# Patient Record
Sex: MALE | Race: WHITE | NOT HISPANIC OR LATINO | Employment: OTHER | URBAN - METROPOLITAN AREA
[De-identification: names, ages, dates, MRNs, and addresses within clinical notes are randomized per-mention and may not be internally consistent; named-entity substitution may affect disease eponyms.]

---

## 2018-10-11 PROBLEM — J43.2 CENTRILOBULAR EMPHYSEMA: Status: ACTIVE | Noted: 2018-10-11

## 2018-10-13 PROBLEM — I70.0 ATHEROSCLEROSIS OF AORTA: Status: ACTIVE | Noted: 2018-10-13

## 2019-06-12 ENCOUNTER — OFFICE VISIT (OUTPATIENT)
Dept: ORTHOPEDICS | Facility: CLINIC | Age: 69
End: 2019-06-12
Payer: MEDICARE

## 2019-06-12 VITALS
HEART RATE: 79 BPM | WEIGHT: 205.25 LBS | SYSTOLIC BLOOD PRESSURE: 139 MMHG | HEIGHT: 72 IN | DIASTOLIC BLOOD PRESSURE: 90 MMHG | BODY MASS INDEX: 27.8 KG/M2

## 2019-06-12 DIAGNOSIS — M67.40 MUCOID CYST OF JOINT: Primary | ICD-10-CM

## 2019-06-12 PROCEDURE — 1101F PR PT FALLS ASSESS DOC 0-1 FALLS W/OUT INJ PAST YR: ICD-10-PCS | Mod: S$GLB,,, | Performed by: ORTHOPAEDIC SURGERY

## 2019-06-12 PROCEDURE — 3080F PR MOST RECENT DIASTOLIC BLOOD PRESSURE >= 90 MM HG: ICD-10-PCS | Mod: S$GLB,,, | Performed by: ORTHOPAEDIC SURGERY

## 2019-06-12 PROCEDURE — 1101F PT FALLS ASSESS-DOCD LE1/YR: CPT | Mod: S$GLB,,, | Performed by: ORTHOPAEDIC SURGERY

## 2019-06-12 PROCEDURE — 99999 PR PBB SHADOW E&M-NEW PATIENT-LVL III: ICD-10-PCS | Mod: PBBFAC,,, | Performed by: ORTHOPAEDIC SURGERY

## 2019-06-12 PROCEDURE — 99203 OFFICE O/P NEW LOW 30 MIN: CPT | Mod: S$GLB,,, | Performed by: ORTHOPAEDIC SURGERY

## 2019-06-12 PROCEDURE — 99203 PR OFFICE/OUTPT VISIT, NEW, LEVL III, 30-44 MIN: ICD-10-PCS | Mod: S$GLB,,, | Performed by: ORTHOPAEDIC SURGERY

## 2019-06-12 PROCEDURE — 3075F PR MOST RECENT SYSTOLIC BLOOD PRESS GE 130-139MM HG: ICD-10-PCS | Mod: S$GLB,,, | Performed by: ORTHOPAEDIC SURGERY

## 2019-06-12 PROCEDURE — 3075F SYST BP GE 130 - 139MM HG: CPT | Mod: S$GLB,,, | Performed by: ORTHOPAEDIC SURGERY

## 2019-06-12 PROCEDURE — 99999 PR PBB SHADOW E&M-NEW PATIENT-LVL III: CPT | Mod: PBBFAC,,, | Performed by: ORTHOPAEDIC SURGERY

## 2019-06-12 PROCEDURE — 3080F DIAST BP >= 90 MM HG: CPT | Mod: S$GLB,,, | Performed by: ORTHOPAEDIC SURGERY

## 2019-06-12 NOTE — H&P
"6/12/2019    Chief Complaint:  Chief Complaint   Patient presents with    Hand Problem       HPI:  Greg Santana is a 68 y.o. male, who presents to clinic today has a history of a mass overlying his right index finger.  States that it has been there for several months and has gradually worsening.  States that it is not causing him any pain or dysfunction.  He is here today for further evaluation.    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 No pertinent family history.    SOCHX:  Social History     Tobacco Use    Smoking status: Former Smoker     Packs/day: 1.00     Years: 20.00     Pack years: 20.00    Smokeless tobacco: Never Used   Substance Use Topics    Alcohol use: Yes     Alcohol/week: 0.6 oz     Types: 1 Glasses of wine per week       ALLERGIES:  Patient has no known allergies.    CURRENT MEDICATIONS:  Current Outpatient Medications on File Prior to Visit   Medication Sig Dispense Refill    allopurinol (ZYLOPRIM) 300 MG tablet TAKE 2 TABLETS DAILY 180 tablet 3    aspirin (ECOTRIN) 81 MG EC tablet Take 81 mg by mouth once daily.      dabigatran etexilate (PRADAXA) 150 mg Cap Take 150 mg by mouth 2 (two) times daily. "Do NOT break, chew, or open capsules."      nebivolol (BYSTOLIC) 5 MG Tab Take 10 mg by mouth once daily.       rosuvastatin (CRESTOR) 20 MG tablet Take 1 tablet (20 mg total) by mouth once daily. 90 tablet 3     No current facility-administered medications on file prior to visit.        REVIEW OF SYSTEMS:  Review of Systems   Constitutional: Negative.    HENT: Negative for hearing loss, nosebleeds " and sore throat.    Respiratory: Negative for shortness of breath and wheezing.    Cardiovascular: Negative for chest pain and palpitations.   Gastrointestinal: Negative for heartburn, nausea and vomiting.   Genitourinary: Negative for dysuria and urgency.   Skin: Negative.    Neurological: Negative for seizures, loss of consciousness and weakness.       GENERAL PHYSICAL EXAM:   BP (!) 139/90   Pulse 79   Ht 6' (1.829 m)   Wt 93.1 kg (205 lb 4 oz)   BMI 27.84 kg/m²    GEN: well developed, well nourished, no acute distress   HENT: Normocephalic, atraumatic   EYES: No discharge, conjunctiva normal   NECK: Supple, non-tender   PULM: No wheezing, no respiratory distress   CV: RRR   ABD: Soft, non-tender    ORTHO EXAM:   Examination of the right hand and index finger reveals that there is a what appears to be ganglion/mucoid cyst overlying the distal interphalangeal joint dorsally.  There is no surrounding erythema or edema.  Palpation in the area produces minimal tenderness.  He is able flex and extend the joint.  He has 2+ radial pulse.    RADIOLOGY:   None    ASSESSMENT:   Right index finger mucoid cyst    PLAN:  1.  Patient has elected to continue to observe this as he is not having any significant difficulty at this time    2.  Will follow up with me on a p.r.n. basis

## 2019-06-12 NOTE — LETTER
June 12, 2019      MATTHEW Morris Jr., MD  [address]  [address]  [facility] [address]           [facility]  [address] [facility] [address] [place] [address]  Phone: [phone]          Patient: Greg Santana   MR Number: [id number]   YOB: 1950   Date of Visit: 6/12/2019       Dear Dr. MATTHEW Morris Jr.:    Thank you for referring Greg Santana to me for evaluation. Attached you will find relevant portions of my assessment and plan of care.    If you have questions, please do not hesitate to call me. I look forward to following Greg Santana along with you.    Sincerely,    Adrian Hayes MD    Enclosure  CC:  No Recipients    If you would like to receive this communication electronically, please contact [email or web address] or [phone] to request more information on [email or web address] access.    For providers and/or their staff who would like to refer a patient to [facility], please contact us through our one-stop-shop provider referral line, [facility], at [phone].    If you feel you have received this communication in error or would no longer like to receive these types of communications, please e-mail [email or web address]

## 2019-07-16 PROBLEM — I34.0 MITRAL VALVE REGURGITATION: Status: ACTIVE | Noted: 2019-07-16

## 2019-10-10 PROBLEM — Z79.899 ON FOUR OR MORE MEDICATIONS: Status: ACTIVE | Noted: 2019-10-10

## 2019-10-10 PROBLEM — Z79.01: Status: ACTIVE | Noted: 2019-10-10

## 2019-10-10 PROBLEM — Z87.891 PERSONAL HISTORY OF TOBACCO USE, PRESENTING HAZARDS TO HEALTH: Status: ACTIVE | Noted: 2019-10-10

## 2019-10-10 PROBLEM — Z79.82 ASPIRIN LONG-TERM USE: Status: ACTIVE | Noted: 2019-10-10

## 2020-06-11 PROBLEM — N18.2 CKD (CHRONIC KIDNEY DISEASE) STAGE 2, GFR 60-89 ML/MIN: Chronic | Status: ACTIVE | Noted: 2020-06-11

## 2020-06-12 PROBLEM — Z79.899 ON STATIN THERAPY: Status: ACTIVE | Noted: 2019-10-10

## 2020-07-24 ENCOUNTER — OFFICE VISIT (OUTPATIENT)
Dept: URGENT CARE | Facility: CLINIC | Age: 70
End: 2020-07-24
Payer: MEDICARE

## 2020-07-24 VITALS
BODY MASS INDEX: 26.51 KG/M2 | HEIGHT: 73 IN | HEART RATE: 92 BPM | TEMPERATURE: 98 F | WEIGHT: 200 LBS | SYSTOLIC BLOOD PRESSURE: 127 MMHG | RESPIRATION RATE: 16 BRPM | DIASTOLIC BLOOD PRESSURE: 79 MMHG

## 2020-07-24 DIAGNOSIS — R09.81 SINUS CONGESTION: ICD-10-CM

## 2020-07-24 PROCEDURE — 99214 OFFICE O/P EST MOD 30 MIN: CPT | Mod: S$GLB,,, | Performed by: PHYSICIAN ASSISTANT

## 2020-07-24 PROCEDURE — U0003 INFECTIOUS AGENT DETECTION BY NUCLEIC ACID (DNA OR RNA); SEVERE ACUTE RESPIRATORY SYNDROME CORONAVIRUS 2 (SARS-COV-2) (CORONAVIRUS DISEASE [COVID-19]), AMPLIFIED PROBE TECHNIQUE, MAKING USE OF HIGH THROUGHPUT TECHNOLOGIES AS DESCRIBED BY CMS-2020-01-R: HCPCS

## 2020-07-24 PROCEDURE — 99214 PR OFFICE/OUTPT VISIT, EST, LEVL IV, 30-39 MIN: ICD-10-PCS | Mod: S$GLB,,, | Performed by: PHYSICIAN ASSISTANT

## 2020-07-24 RX ORDER — AZELASTINE 1 MG/ML
1 SPRAY, METERED NASAL 2 TIMES DAILY
Qty: 30 ML | Refills: 0 | Status: SHIPPED | OUTPATIENT
Start: 2020-07-24 | End: 2020-12-16

## 2020-07-24 RX ORDER — FLUTICASONE PROPIONATE 50 MCG
1 SPRAY, SUSPENSION (ML) NASAL DAILY
Qty: 16 G | Refills: 0 | Status: SHIPPED | OUTPATIENT
Start: 2020-07-24 | End: 2020-12-16

## 2020-07-24 RX ORDER — LATANOPROST 50 UG/ML
1 SOLUTION/ DROPS OPHTHALMIC NIGHTLY
COMMUNITY
Start: 2020-07-09

## 2020-07-24 RX ORDER — LEVOCETIRIZINE DIHYDROCHLORIDE 5 MG/1
5 TABLET, FILM COATED ORAL NIGHTLY
Qty: 30 TABLET | Refills: 0 | Status: SHIPPED | OUTPATIENT
Start: 2020-07-24 | End: 2020-12-16

## 2020-07-24 NOTE — PATIENT INSTRUCTIONS
· Follow up with your primary care if symptoms do not improve, or you may return here at any time.  · If you were referred to a specialist, please follow up with that specialty.  · If you were prescribed antibiotics, please take them to completion.  · If you were prescribed a narcotic or any medication with sedative effects, do not drive or operate heavy equipment or machinery while taking these medications.  · You must understand that you have received treatment at an Urgent Care facility only, and that you may be released before all of your medical problems are known or treated. Urgent Care facilities are not equipped to handle life threatening emergencies. It is recommended that you seek care at an Emergency Department for further evaluation of worsening or concerning symptoms, or possibly life threatening conditions as discussed.                                        If you  smoke, please stop smoking               PLEASE PRACTICE SOCIAL DISTANCING

## 2020-07-24 NOTE — PROGRESS NOTES
"Subjective:       Patient ID: Greg Santana is a 69 y.o. male.    Vitals:  height is 6' 1" (1.854 m) and weight is 90.7 kg (200 lb). His oral temperature is 97.5 °F (36.4 °C). His blood pressure is 127/79 and his pulse is 92. His respiration is 16.     Chief Complaint: Sinus Problem    Patient reports with sinus drainage 3 days, wife is covid positive.    Sinus Problem  This is a new problem. The current episode started in the past 7 days. The problem is unchanged. There has been no fever. Associated symptoms include congestion, headaches and sinus pressure. Pertinent negatives include no chills, coughing, shortness of breath or sore throat. Past treatments include nothing.       Constitution: Negative for chills, fatigue and fever.   HENT: Positive for congestion and sinus pressure. Negative for sore throat.    Neck: Negative for painful lymph nodes.   Cardiovascular: Negative for chest pain and leg swelling.   Eyes: Negative for double vision and blurred vision.   Respiratory: Negative for cough and shortness of breath.    Gastrointestinal: Negative for nausea, vomiting and diarrhea.   Genitourinary: Negative for dysuria, frequency and urgency.   Musculoskeletal: Negative for joint pain, joint swelling, muscle cramps and muscle ache.   Skin: Negative for color change, pale and rash.   Allergic/Immunologic: Negative for seasonal allergies.   Neurological: Positive for headaches. Negative for dizziness, history of vertigo, light-headedness and passing out.   Hematologic/Lymphatic: Negative for swollen lymph nodes, easy bruising/bleeding and history of blood clots. Does not bruise/bleed easily.   Psychiatric/Behavioral: Negative for nervous/anxious, sleep disturbance and depression. The patient is not nervous/anxious.        Objective:      Physical Exam   Constitutional: He is oriented to person, place, and time. He appears well-developed. He is cooperative.  Non-toxic appearance. He does not appear ill. No " distress.   HENT:   Head: Normocephalic and atraumatic.   Ears:   Right Ear: Hearing, tympanic membrane, external ear and ear canal normal.   Left Ear: Hearing, tympanic membrane, external ear and ear canal normal.   Nose: Mucosal edema (mild) present. No rhinorrhea or nasal deformity. No epistaxis. Right sinus exhibits no maxillary sinus tenderness and no frontal sinus tenderness. Left sinus exhibits no maxillary sinus tenderness and no frontal sinus tenderness.   Mouth/Throat: Uvula is midline, oropharynx is clear and moist and mucous membranes are normal. No trismus in the jaw. Normal dentition. No uvula swelling. No oropharyngeal exudate, posterior oropharyngeal edema or posterior oropharyngeal erythema.       Eyes: Pupils are equal, round, and reactive to light. Conjunctivae, EOM and lids are normal. No scleral icterus.   Neck: Trachea normal, normal range of motion, full passive range of motion without pain and phonation normal. Neck supple. No neck rigidity. No edema and no erythema present.   Cardiovascular: Normal rate, regular rhythm, normal heart sounds and normal pulses.   Pulmonary/Chest: Effort normal and breath sounds normal. No respiratory distress. He has no decreased breath sounds. He has no rhonchi.   Abdominal: Soft. Normal appearance and bowel sounds are normal. He exhibits no distension. There is no abdominal tenderness.   Musculoskeletal: Normal range of motion.         General: No deformity.   Neurological: He is alert and oriented to person, place, and time. No cranial nerve deficit. He exhibits normal muscle tone. Coordination normal.   Skin: Skin is warm, dry, intact, not diaphoretic and not pale. Psychiatric: His speech is normal and behavior is normal. Judgment and thought content normal.   Nursing note and vitals reviewed.        Assessment:       1. Sinus congestion        Plan:       All hx was provided by the pt or available as part of established EMR. The pt past medical hx, family  hx, social hx, and current medications were reviewed. Pt to follow up with [facility] if no improvement or for any concern, and seek treatment in an ER for worsening. Tx options discussed. Pt voiced understanding of all discussed, and agreed with decision making.    Sinus congestion  -     COVID-19 Routine Screening  -     azelastine (ASTELIN) 137 mcg (0.1 %) nasal spray; 1 spray (137 mcg total) by Nasal route 2 (two) times daily.  Dispense: 30 mL; Refill: 0  -     fluticasone propionate (FLONASE) 50 mcg/actuation nasal spray; 1 spray (50 mcg total) by Each Nostril route once daily.  Dispense: 16 g; Refill: 0  -     levocetirizine (XYZAL) 5 MG tablet; Take 1 tablet (5 mg total) by mouth every evening.  Dispense: 30 tablet; Refill: 0      Patient Instructions   · Follow up with your primary care if symptoms do not improve, or you may return here at any time.  · If you were referred to a specialist, please follow up with that specialty.  · If you were prescribed antibiotics, please take them to completion.  · If you were prescribed a narcotic or any medication with sedative effects, do not drive or operate heavy equipment or machinery while taking these medications.  · You must understand that you have received treatment at an Urgent Care facility only, and that you may be released before all of your medical problems are known or treated. Urgent Care facilities are not equipped to handle life threatening emergencies. It is recommended that you seek care at an Emergency Department for further evaluation of worsening or concerning symptoms, or possibly life threatening conditions as discussed.                                        If you  smoke, please stop smoking               PLEASE PRACTICE SOCIAL DISTANCING

## 2020-07-26 LAB — SARS-COV-2 RNA RESP QL NAA+PROBE: NOT DETECTED

## 2020-09-14 PROBLEM — I65.21 CAROTID STENOSIS, RIGHT: Status: ACTIVE | Noted: 2020-09-14

## 2021-01-22 ENCOUNTER — CLINICAL SUPPORT (OUTPATIENT)
Dept: URGENT CARE | Facility: CLINIC | Age: 71
End: 2021-01-22
Payer: MEDICARE

## 2021-01-22 ENCOUNTER — PATIENT MESSAGE (OUTPATIENT)
Dept: ADMINISTRATIVE | Facility: OTHER | Age: 71
End: 2021-01-22

## 2021-01-22 VITALS — HEART RATE: 79 BPM | TEMPERATURE: 97 F | OXYGEN SATURATION: 99 %

## 2021-01-22 DIAGNOSIS — J02.9 SORE THROAT: Primary | ICD-10-CM

## 2021-01-22 LAB
CTP QC/QA: YES
SARS-COV-2 RDRP RESP QL NAA+PROBE: NEGATIVE

## 2021-01-22 PROCEDURE — U0002: ICD-10-PCS | Mod: QW,S$GLB,, | Performed by: PHYSICIAN ASSISTANT

## 2021-01-22 PROCEDURE — U0002 COVID-19 LAB TEST NON-CDC: HCPCS | Mod: QW,S$GLB,, | Performed by: PHYSICIAN ASSISTANT

## 2021-02-18 DIAGNOSIS — R91.1 INCIDENTAL LUNG NODULE: Primary | ICD-10-CM

## 2021-02-23 ENCOUNTER — OFFICE VISIT (OUTPATIENT)
Dept: PULMONOLOGY | Facility: CLINIC | Age: 71
End: 2021-02-23
Payer: MEDICARE

## 2021-02-23 VITALS
HEIGHT: 73 IN | HEART RATE: 82 BPM | SYSTOLIC BLOOD PRESSURE: 146 MMHG | WEIGHT: 198.19 LBS | BODY MASS INDEX: 26.27 KG/M2 | DIASTOLIC BLOOD PRESSURE: 78 MMHG

## 2021-02-23 DIAGNOSIS — Z87.891 PERSONAL HISTORY OF TOBACCO USE, PRESENTING HAZARDS TO HEALTH: ICD-10-CM

## 2021-02-23 DIAGNOSIS — R91.8 MULTIPLE LUNG NODULES: ICD-10-CM

## 2021-02-23 DIAGNOSIS — J43.2 CENTRILOBULAR EMPHYSEMA: Primary | ICD-10-CM

## 2021-02-23 DIAGNOSIS — R06.09 DOE (DYSPNEA ON EXERTION): ICD-10-CM

## 2021-02-23 DIAGNOSIS — I48.21 PERMANENT ATRIAL FIBRILLATION: ICD-10-CM

## 2021-02-23 PROCEDURE — 3288F PR FALLS RISK ASSESSMENT DOCUMENTED: ICD-10-PCS | Mod: CPTII,S$GLB,, | Performed by: THORACIC SURGERY (CARDIOTHORACIC VASCULAR SURGERY)

## 2021-02-23 PROCEDURE — 3078F PR MOST RECENT DIASTOLIC BLOOD PRESSURE < 80 MM HG: ICD-10-PCS | Mod: CPTII,S$GLB,, | Performed by: THORACIC SURGERY (CARDIOTHORACIC VASCULAR SURGERY)

## 2021-02-23 PROCEDURE — 3008F BODY MASS INDEX DOCD: CPT | Mod: CPTII,S$GLB,, | Performed by: THORACIC SURGERY (CARDIOTHORACIC VASCULAR SURGERY)

## 2021-02-23 PROCEDURE — 1101F PR PT FALLS ASSESS DOC 0-1 FALLS W/OUT INJ PAST YR: ICD-10-PCS | Mod: CPTII,S$GLB,, | Performed by: THORACIC SURGERY (CARDIOTHORACIC VASCULAR SURGERY)

## 2021-02-23 PROCEDURE — 99999 PR PBB SHADOW E&M-EST. PATIENT-LVL III: CPT | Mod: PBBFAC,,, | Performed by: THORACIC SURGERY (CARDIOTHORACIC VASCULAR SURGERY)

## 2021-02-23 PROCEDURE — 1126F PR PAIN SEVERITY QUANTIFIED, NO PAIN PRESENT: ICD-10-PCS | Mod: S$GLB,,, | Performed by: THORACIC SURGERY (CARDIOTHORACIC VASCULAR SURGERY)

## 2021-02-23 PROCEDURE — 3077F SYST BP >= 140 MM HG: CPT | Mod: CPTII,S$GLB,, | Performed by: THORACIC SURGERY (CARDIOTHORACIC VASCULAR SURGERY)

## 2021-02-23 PROCEDURE — 1159F PR MEDICATION LIST DOCUMENTED IN MEDICAL RECORD: ICD-10-PCS | Mod: S$GLB,,, | Performed by: THORACIC SURGERY (CARDIOTHORACIC VASCULAR SURGERY)

## 2021-02-23 PROCEDURE — 3077F PR MOST RECENT SYSTOLIC BLOOD PRESSURE >= 140 MM HG: ICD-10-PCS | Mod: CPTII,S$GLB,, | Performed by: THORACIC SURGERY (CARDIOTHORACIC VASCULAR SURGERY)

## 2021-02-23 PROCEDURE — 1101F PT FALLS ASSESS-DOCD LE1/YR: CPT | Mod: CPTII,S$GLB,, | Performed by: THORACIC SURGERY (CARDIOTHORACIC VASCULAR SURGERY)

## 2021-02-23 PROCEDURE — 1159F MED LIST DOCD IN RCRD: CPT | Mod: S$GLB,,, | Performed by: THORACIC SURGERY (CARDIOTHORACIC VASCULAR SURGERY)

## 2021-02-23 PROCEDURE — 3008F PR BODY MASS INDEX (BMI) DOCUMENTED: ICD-10-PCS | Mod: CPTII,S$GLB,, | Performed by: THORACIC SURGERY (CARDIOTHORACIC VASCULAR SURGERY)

## 2021-02-23 PROCEDURE — 99999 PR PBB SHADOW E&M-EST. PATIENT-LVL III: ICD-10-PCS | Mod: PBBFAC,,, | Performed by: THORACIC SURGERY (CARDIOTHORACIC VASCULAR SURGERY)

## 2021-02-23 PROCEDURE — 1126F AMNT PAIN NOTED NONE PRSNT: CPT | Mod: S$GLB,,, | Performed by: THORACIC SURGERY (CARDIOTHORACIC VASCULAR SURGERY)

## 2021-02-23 PROCEDURE — 3078F DIAST BP <80 MM HG: CPT | Mod: CPTII,S$GLB,, | Performed by: THORACIC SURGERY (CARDIOTHORACIC VASCULAR SURGERY)

## 2021-02-23 PROCEDURE — 99205 PR OFFICE/OUTPT VISIT, NEW, LEVL V, 60-74 MIN: ICD-10-PCS | Mod: S$GLB,,, | Performed by: THORACIC SURGERY (CARDIOTHORACIC VASCULAR SURGERY)

## 2021-02-23 PROCEDURE — 99205 OFFICE O/P NEW HI 60 MIN: CPT | Mod: S$GLB,,, | Performed by: THORACIC SURGERY (CARDIOTHORACIC VASCULAR SURGERY)

## 2021-02-23 PROCEDURE — 3288F FALL RISK ASSESSMENT DOCD: CPT | Mod: CPTII,S$GLB,, | Performed by: THORACIC SURGERY (CARDIOTHORACIC VASCULAR SURGERY)

## 2021-02-23 RX ORDER — ASPIRIN 325 MG
TABLET ORAL
COMMUNITY
End: 2021-12-10 | Stop reason: ALTCHOICE

## 2021-03-17 ENCOUNTER — LAB VISIT (OUTPATIENT)
Dept: LAB | Facility: HOSPITAL | Age: 71
End: 2021-03-17
Attending: INTERNAL MEDICINE
Payer: MEDICARE

## 2021-03-17 DIAGNOSIS — R91.8 MULTIPLE LUNG NODULES: ICD-10-CM

## 2021-03-17 LAB
ABO + RH BLD: NORMAL
BLD GP AB SCN CELLS X3 SERPL QL: NORMAL

## 2021-03-17 PROCEDURE — 86900 BLOOD TYPING SEROLOGIC ABO: CPT | Mod: PN | Performed by: INTERNAL MEDICINE

## 2021-03-17 PROCEDURE — 86900 BLOOD TYPING SEROLOGIC ABO: CPT | Performed by: INTERNAL MEDICINE

## 2021-03-17 PROCEDURE — 36415 COLL VENOUS BLD VENIPUNCTURE: CPT | Mod: PN | Performed by: INTERNAL MEDICINE

## 2021-03-22 ENCOUNTER — LAB VISIT (OUTPATIENT)
Dept: LAB | Facility: HOSPITAL | Age: 71
End: 2021-03-22
Attending: RADIOLOGY
Payer: MEDICARE

## 2021-03-22 ENCOUNTER — DOCUMENTATION ONLY (OUTPATIENT)
Dept: ADMINISTRATIVE | Facility: OTHER | Age: 71
End: 2021-03-22

## 2021-03-22 DIAGNOSIS — C20 MALIGNANT NEOPLASM OF RECTUM: Primary | ICD-10-CM

## 2021-03-22 LAB
BASOPHILS # BLD AUTO: 0.02 K/UL (ref 0–0.2)
BASOPHILS NFR BLD: 0.2 % (ref 0–1.9)
CREAT SERPL-MCNC: 0.98 MG/DL (ref 0.5–1.4)
DIFFERENTIAL METHOD: ABNORMAL
EOSINOPHIL # BLD AUTO: 0.4 K/UL (ref 0–0.5)
EOSINOPHIL NFR BLD: 4.4 % (ref 0–8)
ERYTHROCYTE [DISTWIDTH] IN BLOOD BY AUTOMATED COUNT: 15.5 % (ref 11.5–14.5)
EST. GFR  (AFRICAN AMERICAN): >60 ML/MIN/1.73 M^2
EST. GFR  (NON AFRICAN AMERICAN): >60 ML/MIN/1.73 M^2
HCT VFR BLD AUTO: 28 % (ref 40–54)
HGB BLD-MCNC: 8.7 G/DL (ref 14–18)
IMM GRANULOCYTES # BLD AUTO: 0.03 K/UL (ref 0–0.04)
IMM GRANULOCYTES NFR BLD AUTO: 0.4 % (ref 0–0.5)
LYMPHOCYTES # BLD AUTO: 1 K/UL (ref 1–4.8)
LYMPHOCYTES NFR BLD: 12.2 % (ref 18–48)
MCH RBC QN AUTO: 27.3 PG (ref 27–31)
MCHC RBC AUTO-ENTMCNC: 31.1 G/DL (ref 32–36)
MCV RBC AUTO: 88 FL (ref 82–98)
MONOCYTES # BLD AUTO: 0.7 K/UL (ref 0.3–1)
MONOCYTES NFR BLD: 8.8 % (ref 4–15)
NEUTROPHILS # BLD AUTO: 6.2 K/UL (ref 1.8–7.7)
NEUTROPHILS NFR BLD: 74 % (ref 38–73)
NRBC BLD-RTO: 0 /100 WBC
PLATELET # BLD AUTO: 407 K/UL (ref 150–350)
PMV BLD AUTO: 9.3 FL (ref 9.2–12.9)
RBC # BLD AUTO: 3.19 M/UL (ref 4.6–6.2)
UUN UR-MCNC: 21 MG/DL (ref 9–21)
WBC # BLD AUTO: 8.42 K/UL (ref 3.9–12.7)

## 2021-03-22 PROCEDURE — 85025 COMPLETE CBC W/AUTO DIFF WBC: CPT | Performed by: RADIOLOGY

## 2021-03-22 PROCEDURE — 82565 ASSAY OF CREATININE: CPT | Mod: PN | Performed by: RADIOLOGY

## 2021-03-22 PROCEDURE — 84520 ASSAY OF UREA NITROGEN: CPT | Performed by: RADIOLOGY

## 2021-03-22 PROCEDURE — 85025 COMPLETE CBC W/AUTO DIFF WBC: CPT | Mod: PN | Performed by: RADIOLOGY

## 2021-03-22 PROCEDURE — 84520 ASSAY OF UREA NITROGEN: CPT | Mod: PN | Performed by: RADIOLOGY

## 2021-03-22 PROCEDURE — 82565 ASSAY OF CREATININE: CPT | Performed by: RADIOLOGY

## 2021-03-22 PROCEDURE — 36415 COLL VENOUS BLD VENIPUNCTURE: CPT | Mod: PN | Performed by: RADIOLOGY

## 2021-03-23 ENCOUNTER — LAB VISIT (OUTPATIENT)
Dept: LAB | Facility: HOSPITAL | Age: 71
End: 2021-03-23
Attending: RADIOLOGY
Payer: MEDICARE

## 2021-03-23 DIAGNOSIS — C20 MALIGNANT NEOPLASM OF RECTUM: Primary | ICD-10-CM

## 2021-03-23 LAB — CEA SERPL-MCNC: 112 NG/ML (ref 0–5)

## 2021-03-23 PROCEDURE — 82378 CARCINOEMBRYONIC ANTIGEN: CPT | Mod: PN | Performed by: RADIOLOGY

## 2021-03-23 PROCEDURE — 36415 COLL VENOUS BLD VENIPUNCTURE: CPT | Mod: PN | Performed by: RADIOLOGY

## 2021-03-23 PROCEDURE — 82378 CARCINOEMBRYONIC ANTIGEN: CPT | Performed by: RADIOLOGY

## 2021-03-25 ENCOUNTER — DOCUMENTATION ONLY (OUTPATIENT)
Dept: ADMINISTRATIVE | Facility: OTHER | Age: 71
End: 2021-03-25

## 2021-04-04 PROBLEM — C20 ADENOCARCINOMA OF RECTUM: Chronic | Status: ACTIVE | Noted: 2021-04-04

## 2021-04-05 ENCOUNTER — DOCUMENTATION ONLY (OUTPATIENT)
Dept: ADMINISTRATIVE | Facility: OTHER | Age: 71
End: 2021-04-05

## 2021-04-07 ENCOUNTER — LAB VISIT (OUTPATIENT)
Dept: LAB | Facility: HOSPITAL | Age: 71
End: 2021-04-07
Attending: PHYSICIAN ASSISTANT
Payer: MEDICARE

## 2021-04-07 DIAGNOSIS — D64.9 ANEMIA, UNSPECIFIED TYPE: ICD-10-CM

## 2021-04-07 DIAGNOSIS — C20 ADENOCARCINOMA OF RECTUM: ICD-10-CM

## 2021-04-07 LAB
BASOPHILS # BLD AUTO: 0.01 K/UL (ref 0–0.2)
BASOPHILS NFR BLD: 0.2 % (ref 0–1.9)
DIFFERENTIAL METHOD: ABNORMAL
EOSINOPHIL # BLD AUTO: 0.4 K/UL (ref 0–0.5)
EOSINOPHIL NFR BLD: 5.6 % (ref 0–8)
ERYTHROCYTE [DISTWIDTH] IN BLOOD BY AUTOMATED COUNT: 16.2 % (ref 11.5–14.5)
FERRITIN SERPL-MCNC: 10 NG/ML (ref 18–464)
HCT VFR BLD AUTO: 26.3 % (ref 40–54)
HGB BLD-MCNC: 7.8 G/DL (ref 14–18)
IMM GRANULOCYTES # BLD AUTO: 0.04 K/UL (ref 0–0.04)
IMM GRANULOCYTES NFR BLD AUTO: 0.6 % (ref 0–0.5)
IRON SATN MFR SERPL: 7 % (ref 20–50)
IRON SERPL-MCNC: 33 UG/DL (ref 45–160)
LYMPHOCYTES # BLD AUTO: 0.4 K/UL (ref 1–4.8)
LYMPHOCYTES NFR BLD: 5.7 % (ref 18–48)
MCH RBC QN AUTO: 25.5 PG (ref 27–31)
MCHC RBC AUTO-ENTMCNC: 29.7 G/DL (ref 32–36)
MCV RBC AUTO: 86 FL (ref 82–98)
MONOCYTES # BLD AUTO: 0.6 K/UL (ref 0.3–1)
MONOCYTES NFR BLD: 8.7 % (ref 4–15)
NEUTROPHILS # BLD AUTO: 5.3 K/UL (ref 1.8–7.7)
NEUTROPHILS NFR BLD: 79.2 % (ref 38–73)
NRBC BLD-RTO: 0 /100 WBC
PLATELET # BLD AUTO: 341 K/UL (ref 150–450)
PMV BLD AUTO: 9 FL (ref 9.2–12.9)
RBC # BLD AUTO: 3.06 M/UL (ref 4.6–6.2)
TOTAL IRON BINDING CAPACITY: 493 UG/DL (ref 261–462)
WBC # BLD AUTO: 6.64 K/UL (ref 3.9–12.7)

## 2021-04-07 PROCEDURE — 82728 ASSAY OF FERRITIN: CPT | Performed by: PHYSICIAN ASSISTANT

## 2021-04-07 PROCEDURE — 81400 MOPATH PROCEDURE LEVEL 1: CPT | Performed by: PHYSICIAN ASSISTANT

## 2021-04-07 PROCEDURE — 36415 COLL VENOUS BLD VENIPUNCTURE: CPT | Mod: PN | Performed by: PHYSICIAN ASSISTANT

## 2021-04-07 PROCEDURE — 82728 ASSAY OF FERRITIN: CPT | Mod: PN | Performed by: PHYSICIAN ASSISTANT

## 2021-04-07 PROCEDURE — 83540 ASSAY OF IRON: CPT | Performed by: PHYSICIAN ASSISTANT

## 2021-04-07 PROCEDURE — 83550 IRON BINDING TEST: CPT | Mod: PN | Performed by: PHYSICIAN ASSISTANT

## 2021-04-07 PROCEDURE — 83540 ASSAY OF IRON: CPT | Mod: PN | Performed by: PHYSICIAN ASSISTANT

## 2021-04-07 PROCEDURE — 83550 IRON BINDING TEST: CPT | Performed by: PHYSICIAN ASSISTANT

## 2021-04-07 PROCEDURE — 85025 COMPLETE CBC W/AUTO DIFF WBC: CPT | Performed by: PHYSICIAN ASSISTANT

## 2021-04-07 PROCEDURE — 85025 COMPLETE CBC W/AUTO DIFF WBC: CPT | Mod: PN | Performed by: PHYSICIAN ASSISTANT

## 2021-04-08 PROBLEM — D50.9 IRON DEFICIENCY ANEMIA: Status: ACTIVE | Noted: 2021-04-08

## 2021-04-14 PROBLEM — T50.905A PLATELET DYSFUNCTION DUE TO DRUGS: Status: ACTIVE | Noted: 2021-04-14

## 2021-04-14 PROBLEM — D49.0 COLORECTAL NEOPLASM: Status: ACTIVE | Noted: 2021-04-14

## 2021-04-14 PROBLEM — D69.59 PLATELET DYSFUNCTION DUE TO DRUGS: Status: ACTIVE | Noted: 2021-04-14

## 2021-04-15 LAB
ANNOTATION COMMENT IMP: NORMAL
DPYD DISCLAIMER: NORMAL
DPYD INTERPRETATION: NORMAL
DPYD METHOD: NORMAL
DPYD RESULT DETAILS: NORMAL
DYPD TOXICITY RISK: NORMAL
REVIEWED BY: NORMAL

## 2021-04-29 ENCOUNTER — LAB VISIT (OUTPATIENT)
Dept: LAB | Facility: HOSPITAL | Age: 71
End: 2021-04-29
Attending: PHYSICIAN ASSISTANT
Payer: MEDICARE

## 2021-04-29 ENCOUNTER — NUTRITION (OUTPATIENT)
Dept: INFUSION THERAPY | Facility: HOSPITAL | Age: 71
End: 2021-04-29
Attending: PHYSICIAN ASSISTANT
Payer: MEDICARE

## 2021-04-29 DIAGNOSIS — C20 ADENOCARCINOMA OF RECTUM: ICD-10-CM

## 2021-04-29 DIAGNOSIS — D49.0 COLORECTAL NEOPLASM: Primary | ICD-10-CM

## 2021-04-29 DIAGNOSIS — D50.9 IRON DEFICIENCY ANEMIA, UNSPECIFIED IRON DEFICIENCY ANEMIA TYPE: ICD-10-CM

## 2021-04-29 LAB
ABO + RH BLD: NORMAL
ALBUMIN SERPL BCP-MCNC: 3.6 G/DL (ref 3.5–5.2)
ALP SERPL-CCNC: 88 U/L (ref 38–145)
ALT SERPL W/O P-5'-P-CCNC: 14 U/L (ref 0–50)
ANION GAP SERPL CALC-SCNC: 6 MMOL/L (ref 8–16)
ANISOCYTOSIS BLD QL SMEAR: ABNORMAL
AST SERPL-CCNC: 29 U/L (ref 17–59)
BASOPHILS NFR BLD: 1 % (ref 0–1.9)
BILIRUB SERPL-MCNC: 0.7 MG/DL (ref 0.2–1.3)
BLD GP AB SCN CELLS X3 SERPL QL: NORMAL
CALCIUM SERPL-MCNC: 8.9 MG/DL (ref 8.4–10.2)
CHLORIDE SERPL-SCNC: 105 MMOL/L (ref 95–110)
CO2 SERPL-SCNC: 26 MMOL/L (ref 22–31)
CREAT SERPL-MCNC: 0.89 MG/DL (ref 0.5–1.4)
DACRYOCYTES BLD QL SMEAR: ABNORMAL
DIFFERENTIAL METHOD: ABNORMAL
EOSINOPHIL NFR BLD: 4 % (ref 0–8)
ERYTHROCYTE [DISTWIDTH] IN BLOOD BY AUTOMATED COUNT: 23.1 % (ref 11.5–14.5)
EST. GFR  (AFRICAN AMERICAN): >60 ML/MIN/1.73 M^2
EST. GFR  (NON AFRICAN AMERICAN): >60 ML/MIN/1.73 M^2
GIANT PLATELETS BLD QL SMEAR: PRESENT
GLUCOSE SERPL-MCNC: 102 MG/DL (ref 70–110)
HCT VFR BLD AUTO: 23 % (ref 40–54)
HGB BLD-MCNC: 6.7 G/DL (ref 14–18)
HYPOCHROMIA BLD QL SMEAR: ABNORMAL
IMM GRANULOCYTES # BLD AUTO: ABNORMAL K/UL (ref 0–0.04)
IMM GRANULOCYTES NFR BLD AUTO: ABNORMAL % (ref 0–0.5)
LYMPHOCYTES NFR BLD: 6 % (ref 18–48)
MCH RBC QN AUTO: 26.8 PG (ref 27–31)
MCHC RBC AUTO-ENTMCNC: 29.1 G/DL (ref 32–36)
MCV RBC AUTO: 92 FL (ref 82–98)
MONOCYTES NFR BLD: 4 % (ref 4–15)
NEUTROPHILS # BLD AUTO: 5.5 K/UL (ref 1.8–7.7)
NEUTROPHILS NFR BLD: 84 % (ref 38–73)
NEUTS BAND NFR BLD MANUAL: 1 %
NRBC BLD-RTO: 0 /100 WBC
OVALOCYTES BLD QL SMEAR: ABNORMAL
PLATELET # BLD AUTO: 298 K/UL (ref 150–450)
PLATELET BLD QL SMEAR: ABNORMAL
PMV BLD AUTO: 9.1 FL (ref 9.2–12.9)
POIKILOCYTOSIS BLD QL SMEAR: SLIGHT
POLYCHROMASIA BLD QL SMEAR: ABNORMAL
POTASSIUM SERPL-SCNC: 4.3 MMOL/L (ref 3.5–5.1)
PROT SERPL-MCNC: 6.2 G/DL (ref 6–8.4)
RBC # BLD AUTO: 2.5 M/UL (ref 4.6–6.2)
SODIUM SERPL-SCNC: 137 MMOL/L (ref 136–145)
UUN UR-MCNC: 16 MG/DL (ref 9–21)
WBC # BLD AUTO: 6.45 K/UL (ref 3.9–12.7)

## 2021-04-29 PROCEDURE — 36415 COLL VENOUS BLD VENIPUNCTURE: CPT | Mod: PN | Performed by: PHYSICIAN ASSISTANT

## 2021-04-29 PROCEDURE — 85025 COMPLETE CBC W/AUTO DIFF WBC: CPT | Performed by: PHYSICIAN ASSISTANT

## 2021-04-29 PROCEDURE — 86900 BLOOD TYPING SEROLOGIC ABO: CPT | Mod: PN | Performed by: PHYSICIAN ASSISTANT

## 2021-04-29 PROCEDURE — 80053 COMPREHEN METABOLIC PANEL: CPT | Performed by: PHYSICIAN ASSISTANT

## 2021-04-29 PROCEDURE — 80053 COMPREHEN METABOLIC PANEL: CPT | Mod: PN | Performed by: PHYSICIAN ASSISTANT

## 2021-04-29 PROCEDURE — 85025 COMPLETE CBC W/AUTO DIFF WBC: CPT | Mod: PN | Performed by: PHYSICIAN ASSISTANT

## 2021-04-29 PROCEDURE — 86900 BLOOD TYPING SEROLOGIC ABO: CPT | Performed by: PHYSICIAN ASSISTANT

## 2021-05-03 ENCOUNTER — DOCUMENTATION ONLY (OUTPATIENT)
Dept: INFUSION THERAPY | Facility: HOSPITAL | Age: 71
End: 2021-05-03

## 2021-05-04 ENCOUNTER — NUTRITION (OUTPATIENT)
Dept: INFUSION THERAPY | Facility: HOSPITAL | Age: 71
End: 2021-05-04
Attending: PHYSICIAN ASSISTANT
Payer: MEDICARE

## 2021-05-04 VITALS
RESPIRATION RATE: 18 BRPM | WEIGHT: 180.56 LBS | BODY MASS INDEX: 23.82 KG/M2 | DIASTOLIC BLOOD PRESSURE: 76 MMHG | SYSTOLIC BLOOD PRESSURE: 163 MMHG | TEMPERATURE: 97 F | HEART RATE: 80 BPM

## 2021-05-04 DIAGNOSIS — D49.0 COLORECTAL NEOPLASM: Primary | ICD-10-CM

## 2021-05-04 PROCEDURE — 96413 CHEMO IV INFUSION 1 HR: CPT | Mod: PN

## 2021-05-04 PROCEDURE — 96416 CHEMO PROLONG INFUSE W/PUMP: CPT | Mod: PN

## 2021-05-04 PROCEDURE — 63600175 PHARM REV CODE 636 W HCPCS: Mod: PN | Performed by: INTERNAL MEDICINE

## 2021-05-04 PROCEDURE — 96415 CHEMO IV INFUSION ADDL HR: CPT | Mod: PN

## 2021-05-04 PROCEDURE — 96367 TX/PROPH/DG ADDL SEQ IV INF: CPT | Mod: PN

## 2021-05-04 PROCEDURE — 25000003 PHARM REV CODE 250: Mod: PN | Performed by: INTERNAL MEDICINE

## 2021-05-04 RX ORDER — SODIUM CHLORIDE 0.9 % (FLUSH) 0.9 %
10 SYRINGE (ML) INJECTION
Status: DISCONTINUED | OUTPATIENT
Start: 2021-05-04 | End: 2021-05-04 | Stop reason: HOSPADM

## 2021-05-04 RX ORDER — HEPARIN 100 UNIT/ML
500 SYRINGE INTRAVENOUS
Status: DISCONTINUED | OUTPATIENT
Start: 2021-05-04 | End: 2021-05-04 | Stop reason: HOSPADM

## 2021-05-04 RX ORDER — DIPHENHYDRAMINE HYDROCHLORIDE 50 MG/ML
50 INJECTION INTRAMUSCULAR; INTRAVENOUS ONCE AS NEEDED
Status: DISCONTINUED | OUTPATIENT
Start: 2021-05-04 | End: 2021-05-04 | Stop reason: HOSPADM

## 2021-05-04 RX ORDER — EPINEPHRINE 0.3 MG/.3ML
0.3 INJECTION SUBCUTANEOUS ONCE AS NEEDED
Status: DISCONTINUED | OUTPATIENT
Start: 2021-05-04 | End: 2021-05-04 | Stop reason: HOSPADM

## 2021-05-04 RX ADMIN — OXALIPLATIN 174 MG: 100 INJECTION, SOLUTION INTRAVENOUS at 02:05

## 2021-05-04 RX ADMIN — SODIUM CHLORIDE: 900 INJECTION, SOLUTION INTRAVENOUS at 01:05

## 2021-05-04 RX ADMIN — FLUOROURACIL 1230 MG: 50 INJECTION, SOLUTION INTRAVENOUS at 05:05

## 2021-05-04 RX ADMIN — DEXAMETHASONE SODIUM PHOSPHATE: 4 INJECTION, SOLUTION INTRA-ARTICULAR; INTRALESIONAL; INTRAMUSCULAR; INTRAVENOUS; SOFT TISSUE at 01:05

## 2021-05-04 RX ADMIN — DEXTROSE: 5 SOLUTION INTRAVENOUS at 02:05

## 2021-05-06 ENCOUNTER — INFUSION (OUTPATIENT)
Dept: INFUSION THERAPY | Facility: HOSPITAL | Age: 71
End: 2021-05-06
Attending: PHYSICIAN ASSISTANT
Payer: MEDICARE

## 2021-05-06 VITALS — RESPIRATION RATE: 20 BRPM | SYSTOLIC BLOOD PRESSURE: 145 MMHG | HEART RATE: 78 BPM | DIASTOLIC BLOOD PRESSURE: 76 MMHG

## 2021-05-06 DIAGNOSIS — D49.0 COLORECTAL NEOPLASM: Primary | ICD-10-CM

## 2021-05-06 PROCEDURE — 96523 IRRIG DRUG DELIVERY DEVICE: CPT | Mod: PN

## 2021-05-06 PROCEDURE — A4216 STERILE WATER/SALINE, 10 ML: HCPCS | Mod: PN | Performed by: INTERNAL MEDICINE

## 2021-05-06 PROCEDURE — 25000003 PHARM REV CODE 250: Mod: PN | Performed by: INTERNAL MEDICINE

## 2021-05-06 RX ORDER — SODIUM CHLORIDE 0.9 % (FLUSH) 0.9 %
10 SYRINGE (ML) INJECTION
Status: DISCONTINUED | OUTPATIENT
Start: 2021-05-06 | End: 2021-05-06 | Stop reason: HOSPADM

## 2021-05-06 RX ADMIN — Medication 10 ML: at 01:05

## 2021-05-08 ENCOUNTER — PATIENT MESSAGE (OUTPATIENT)
Dept: INFUSION THERAPY | Facility: HOSPITAL | Age: 71
End: 2021-05-08

## 2021-05-10 ENCOUNTER — PATIENT MESSAGE (OUTPATIENT)
Dept: RESEARCH | Facility: HOSPITAL | Age: 71
End: 2021-05-10

## 2021-05-17 ENCOUNTER — LAB VISIT (OUTPATIENT)
Dept: LAB | Facility: HOSPITAL | Age: 71
End: 2021-05-17
Attending: PHYSICIAN ASSISTANT
Payer: MEDICARE

## 2021-05-17 DIAGNOSIS — C20 ADENOCARCINOMA OF RECTUM: ICD-10-CM

## 2021-05-17 DIAGNOSIS — D50.9 IRON DEFICIENCY ANEMIA, UNSPECIFIED IRON DEFICIENCY ANEMIA TYPE: ICD-10-CM

## 2021-05-17 LAB
ALBUMIN SERPL BCP-MCNC: 4.3 G/DL (ref 3.5–5.2)
ALP SERPL-CCNC: 85 U/L (ref 38–145)
ALT SERPL W/O P-5'-P-CCNC: 33 U/L (ref 0–50)
ANION GAP SERPL CALC-SCNC: 12 MMOL/L (ref 8–16)
ANISOCYTOSIS BLD QL SMEAR: SLIGHT
AST SERPL-CCNC: 37 U/L (ref 17–59)
BASOPHILS # BLD AUTO: 0.04 K/UL (ref 0–0.2)
BASOPHILS NFR BLD: 0.4 % (ref 0–1.9)
BILIRUB SERPL-MCNC: 0.9 MG/DL (ref 0.2–1.3)
BURR CELLS BLD QL SMEAR: ABNORMAL
CALCIUM SERPL-MCNC: 9.3 MG/DL (ref 8.4–10.2)
CHLORIDE SERPL-SCNC: 104 MMOL/L (ref 95–110)
CO2 SERPL-SCNC: 23 MMOL/L (ref 22–31)
CREAT SERPL-MCNC: 0.85 MG/DL (ref 0.5–1.4)
DACRYOCYTES BLD QL SMEAR: ABNORMAL
DIFFERENTIAL METHOD: ABNORMAL
EOSINOPHIL # BLD AUTO: 0.3 K/UL (ref 0–0.5)
EOSINOPHIL NFR BLD: 2.3 % (ref 0–8)
ERYTHROCYTE [DISTWIDTH] IN BLOOD BY AUTOMATED COUNT: 22.1 % (ref 11.5–14.5)
EST. GFR  (AFRICAN AMERICAN): >60 ML/MIN/1.73 M^2
EST. GFR  (NON AFRICAN AMERICAN): >60 ML/MIN/1.73 M^2
GLUCOSE SERPL-MCNC: 148 MG/DL (ref 70–110)
HCT VFR BLD AUTO: 38.4 % (ref 40–54)
HGB BLD-MCNC: 11.4 G/DL (ref 14–18)
HYPOCHROMIA BLD QL SMEAR: ABNORMAL
IMM GRANULOCYTES # BLD AUTO: 0.07 K/UL (ref 0–0.04)
IMM GRANULOCYTES NFR BLD AUTO: 0.6 % (ref 0–0.5)
LYMPHOCYTES # BLD AUTO: 0.5 K/UL (ref 1–4.8)
LYMPHOCYTES NFR BLD: 4.5 % (ref 18–48)
MAGNESIUM SERPL-MCNC: 2.2 MG/DL (ref 1.6–2.6)
MCH RBC QN AUTO: 27.7 PG (ref 27–31)
MCHC RBC AUTO-ENTMCNC: 29.7 G/DL (ref 32–36)
MCV RBC AUTO: 93 FL (ref 82–98)
MONOCYTES # BLD AUTO: 0.6 K/UL (ref 0.3–1)
MONOCYTES NFR BLD: 5.5 % (ref 4–15)
NEUTROPHILS # BLD AUTO: 9.6 K/UL (ref 1.8–7.7)
NEUTROPHILS NFR BLD: 86.7 % (ref 38–73)
NRBC BLD-RTO: 0 /100 WBC
OVALOCYTES BLD QL SMEAR: ABNORMAL
PLATELET # BLD AUTO: 283 K/UL (ref 150–450)
PLATELET BLD QL SMEAR: ABNORMAL
PMV BLD AUTO: 9.5 FL (ref 9.2–12.9)
POIKILOCYTOSIS BLD QL SMEAR: SLIGHT
POTASSIUM SERPL-SCNC: 4.4 MMOL/L (ref 3.5–5.1)
PROT SERPL-MCNC: 6.9 G/DL (ref 6–8.4)
RBC # BLD AUTO: 4.11 M/UL (ref 4.6–6.2)
SODIUM SERPL-SCNC: 139 MMOL/L (ref 136–145)
UUN UR-MCNC: 19 MG/DL (ref 9–21)
WBC # BLD AUTO: 11.08 K/UL (ref 3.9–12.7)

## 2021-05-17 PROCEDURE — 83735 ASSAY OF MAGNESIUM: CPT | Performed by: PHYSICIAN ASSISTANT

## 2021-05-17 PROCEDURE — 83735 ASSAY OF MAGNESIUM: CPT | Mod: PN | Performed by: PHYSICIAN ASSISTANT

## 2021-05-17 PROCEDURE — 85025 COMPLETE CBC W/AUTO DIFF WBC: CPT | Performed by: PHYSICIAN ASSISTANT

## 2021-05-17 PROCEDURE — 36415 COLL VENOUS BLD VENIPUNCTURE: CPT | Mod: PN | Performed by: PHYSICIAN ASSISTANT

## 2021-05-17 PROCEDURE — 80053 COMPREHEN METABOLIC PANEL: CPT | Mod: PN | Performed by: PHYSICIAN ASSISTANT

## 2021-05-17 PROCEDURE — 85025 COMPLETE CBC W/AUTO DIFF WBC: CPT | Mod: PN | Performed by: PHYSICIAN ASSISTANT

## 2021-05-17 PROCEDURE — 80053 COMPREHEN METABOLIC PANEL: CPT | Performed by: PHYSICIAN ASSISTANT

## 2021-05-18 ENCOUNTER — INFUSION (OUTPATIENT)
Dept: INFUSION THERAPY | Facility: HOSPITAL | Age: 71
End: 2021-05-18
Attending: PHYSICIAN ASSISTANT
Payer: MEDICARE

## 2021-05-18 VITALS
BODY MASS INDEX: 22.09 KG/M2 | HEIGHT: 73 IN | WEIGHT: 166.69 LBS | HEART RATE: 76 BPM | RESPIRATION RATE: 16 BRPM | SYSTOLIC BLOOD PRESSURE: 138 MMHG | TEMPERATURE: 97 F | DIASTOLIC BLOOD PRESSURE: 90 MMHG

## 2021-05-18 DIAGNOSIS — D49.0 COLORECTAL NEOPLASM: Primary | ICD-10-CM

## 2021-05-18 PROCEDURE — 63600175 PHARM REV CODE 636 W HCPCS: Mod: PN | Performed by: PHYSICIAN ASSISTANT

## 2021-05-18 PROCEDURE — 96416 CHEMO PROLONG INFUSE W/PUMP: CPT | Mod: PN

## 2021-05-18 PROCEDURE — A4216 STERILE WATER/SALINE, 10 ML: HCPCS | Mod: PN | Performed by: PHYSICIAN ASSISTANT

## 2021-05-18 PROCEDURE — 96367 TX/PROPH/DG ADDL SEQ IV INF: CPT | Mod: PN

## 2021-05-18 PROCEDURE — 96413 CHEMO IV INFUSION 1 HR: CPT | Mod: PN

## 2021-05-18 PROCEDURE — 25000003 PHARM REV CODE 250: Mod: PN | Performed by: PHYSICIAN ASSISTANT

## 2021-05-18 PROCEDURE — 96415 CHEMO IV INFUSION ADDL HR: CPT | Mod: PN

## 2021-05-18 RX ORDER — EPINEPHRINE 0.3 MG/.3ML
0.3 INJECTION SUBCUTANEOUS ONCE AS NEEDED
Status: DISCONTINUED | OUTPATIENT
Start: 2021-05-18 | End: 2021-05-18 | Stop reason: HOSPADM

## 2021-05-18 RX ORDER — DIPHENHYDRAMINE HYDROCHLORIDE 50 MG/ML
50 INJECTION INTRAMUSCULAR; INTRAVENOUS ONCE AS NEEDED
Status: DISCONTINUED | OUTPATIENT
Start: 2021-05-18 | End: 2021-05-18 | Stop reason: HOSPADM

## 2021-05-18 RX ORDER — SODIUM CHLORIDE 0.9 % (FLUSH) 0.9 %
10 SYRINGE (ML) INJECTION
Status: DISCONTINUED | OUTPATIENT
Start: 2021-05-18 | End: 2021-05-18 | Stop reason: HOSPADM

## 2021-05-18 RX ADMIN — DEXAMETHASONE SODIUM PHOSPHATE 0.25 MG: 4 INJECTION, SOLUTION INTRA-ARTICULAR; INTRALESIONAL; INTRAMUSCULAR; INTRAVENOUS; SOFT TISSUE at 01:05

## 2021-05-18 RX ADMIN — DEXTROSE: 5 SOLUTION INTRAVENOUS at 01:05

## 2021-05-18 RX ADMIN — FLUOROURACIL 2460 MG: 50 INJECTION, SOLUTION INTRAVENOUS at 03:05

## 2021-05-18 RX ADMIN — Medication 10 ML: at 01:05

## 2021-05-18 RX ADMIN — OXALIPLATIN 174 MG: 5 INJECTION, SOLUTION INTRAVENOUS at 01:05

## 2021-05-19 ENCOUNTER — PATIENT MESSAGE (OUTPATIENT)
Dept: INFUSION THERAPY | Facility: HOSPITAL | Age: 71
End: 2021-05-19

## 2021-05-20 ENCOUNTER — INFUSION (OUTPATIENT)
Dept: INFUSION THERAPY | Facility: HOSPITAL | Age: 71
End: 2021-05-20
Attending: PHYSICIAN ASSISTANT
Payer: MEDICARE

## 2021-05-20 VITALS
HEART RATE: 68 BPM | DIASTOLIC BLOOD PRESSURE: 77 MMHG | SYSTOLIC BLOOD PRESSURE: 132 MMHG | TEMPERATURE: 98 F | RESPIRATION RATE: 16 BRPM

## 2021-05-20 DIAGNOSIS — D49.0 COLORECTAL NEOPLASM: Primary | ICD-10-CM

## 2021-05-20 PROCEDURE — A4216 STERILE WATER/SALINE, 10 ML: HCPCS | Mod: PN | Performed by: PHYSICIAN ASSISTANT

## 2021-05-20 PROCEDURE — 25000003 PHARM REV CODE 250: Mod: PN | Performed by: PHYSICIAN ASSISTANT

## 2021-05-20 PROCEDURE — 96523 IRRIG DRUG DELIVERY DEVICE: CPT | Mod: PN

## 2021-05-20 RX ORDER — SODIUM CHLORIDE 0.9 % (FLUSH) 0.9 %
10 SYRINGE (ML) INJECTION
Status: DISCONTINUED | OUTPATIENT
Start: 2021-05-20 | End: 2021-05-20 | Stop reason: HOSPADM

## 2021-05-20 RX ADMIN — Medication 10 ML: at 01:05

## 2021-05-26 ENCOUNTER — LAB VISIT (OUTPATIENT)
Dept: LAB | Facility: HOSPITAL | Age: 71
End: 2021-05-26
Attending: INTERNAL MEDICINE
Payer: MEDICARE

## 2021-05-26 DIAGNOSIS — C20 ADENOCARCINOMA OF RECTUM: ICD-10-CM

## 2021-05-26 DIAGNOSIS — D50.9 IRON DEFICIENCY ANEMIA, UNSPECIFIED IRON DEFICIENCY ANEMIA TYPE: ICD-10-CM

## 2021-05-26 LAB
BASOPHILS # BLD AUTO: 0.03 K/UL (ref 0–0.2)
BASOPHILS NFR BLD: 0.4 % (ref 0–1.9)
DIFFERENTIAL METHOD: ABNORMAL
EOSINOPHIL # BLD AUTO: 0.3 K/UL (ref 0–0.5)
EOSINOPHIL NFR BLD: 4.6 % (ref 0–8)
ERYTHROCYTE [DISTWIDTH] IN BLOOD BY AUTOMATED COUNT: 21.1 % (ref 11.5–14.5)
HCT VFR BLD AUTO: 37.6 % (ref 40–54)
HGB BLD-MCNC: 11.6 G/DL (ref 14–18)
IMM GRANULOCYTES # BLD AUTO: 0.04 K/UL (ref 0–0.04)
IMM GRANULOCYTES NFR BLD AUTO: 0.5 % (ref 0–0.5)
LYMPHOCYTES # BLD AUTO: 0.7 K/UL (ref 1–4.8)
LYMPHOCYTES NFR BLD: 9 % (ref 18–48)
MCH RBC QN AUTO: 28 PG (ref 27–31)
MCHC RBC AUTO-ENTMCNC: 30.9 G/DL (ref 32–36)
MCV RBC AUTO: 91 FL (ref 82–98)
MONOCYTES # BLD AUTO: 0.7 K/UL (ref 0.3–1)
MONOCYTES NFR BLD: 9.6 % (ref 4–15)
NEUTROPHILS # BLD AUTO: 5.6 K/UL (ref 1.8–7.7)
NEUTROPHILS NFR BLD: 75.9 % (ref 38–73)
NRBC BLD-RTO: 0 /100 WBC
PLATELET # BLD AUTO: 238 K/UL (ref 150–450)
PMV BLD AUTO: 9.5 FL (ref 9.2–12.9)
RBC # BLD AUTO: 4.14 M/UL (ref 4.6–6.2)
WBC # BLD AUTO: 7.41 K/UL (ref 3.9–12.7)

## 2021-05-26 PROCEDURE — 36415 COLL VENOUS BLD VENIPUNCTURE: CPT | Mod: PN | Performed by: PHYSICIAN ASSISTANT

## 2021-05-26 PROCEDURE — 85025 COMPLETE CBC W/AUTO DIFF WBC: CPT | Mod: PN | Performed by: PHYSICIAN ASSISTANT

## 2021-05-26 PROCEDURE — 85025 COMPLETE CBC W/AUTO DIFF WBC: CPT | Performed by: PHYSICIAN ASSISTANT

## 2021-06-01 ENCOUNTER — NUTRITION (OUTPATIENT)
Dept: INFUSION THERAPY | Facility: HOSPITAL | Age: 71
End: 2021-06-01
Attending: PHYSICIAN ASSISTANT
Payer: MEDICARE

## 2021-06-01 VITALS
HEART RATE: 59 BPM | HEIGHT: 73 IN | SYSTOLIC BLOOD PRESSURE: 139 MMHG | TEMPERATURE: 98 F | WEIGHT: 163 LBS | DIASTOLIC BLOOD PRESSURE: 55 MMHG | RESPIRATION RATE: 16 BRPM | BODY MASS INDEX: 21.6 KG/M2

## 2021-06-01 DIAGNOSIS — D49.0 COLORECTAL NEOPLASM: Primary | ICD-10-CM

## 2021-06-01 PROCEDURE — 96367 TX/PROPH/DG ADDL SEQ IV INF: CPT | Mod: PN

## 2021-06-01 PROCEDURE — 96413 CHEMO IV INFUSION 1 HR: CPT | Mod: PN

## 2021-06-01 PROCEDURE — 96415 CHEMO IV INFUSION ADDL HR: CPT | Mod: PN

## 2021-06-01 PROCEDURE — 63600175 PHARM REV CODE 636 W HCPCS: Mod: PN | Performed by: PHYSICIAN ASSISTANT

## 2021-06-01 PROCEDURE — 96416 CHEMO PROLONG INFUSE W/PUMP: CPT | Mod: PN

## 2021-06-01 PROCEDURE — 25000003 PHARM REV CODE 250: Mod: PN | Performed by: PHYSICIAN ASSISTANT

## 2021-06-01 RX ORDER — EPINEPHRINE 0.3 MG/.3ML
0.3 INJECTION SUBCUTANEOUS ONCE AS NEEDED
Status: DISCONTINUED | OUTPATIENT
Start: 2021-06-01 | End: 2021-06-01 | Stop reason: HOSPADM

## 2021-06-01 RX ORDER — DIPHENHYDRAMINE HYDROCHLORIDE 50 MG/ML
50 INJECTION INTRAMUSCULAR; INTRAVENOUS ONCE AS NEEDED
Status: DISCONTINUED | OUTPATIENT
Start: 2021-06-01 | End: 2021-06-01 | Stop reason: HOSPADM

## 2021-06-01 RX ORDER — SODIUM CHLORIDE 0.9 % (FLUSH) 0.9 %
10 SYRINGE (ML) INJECTION
Status: DISCONTINUED | OUTPATIENT
Start: 2021-06-01 | End: 2021-06-01 | Stop reason: HOSPADM

## 2021-06-01 RX ADMIN — OXALIPLATIN 174 MG: 100 INJECTION, SOLUTION, CONCENTRATE INTRAVENOUS at 03:06

## 2021-06-01 RX ADMIN — DEXTROSE: 5 SOLUTION INTRAVENOUS at 03:06

## 2021-06-01 RX ADMIN — DEXAMETHASONE SODIUM PHOSPHATE 0.25 MG: 4 INJECTION, SOLUTION INTRA-ARTICULAR; INTRALESIONAL; INTRAMUSCULAR; INTRAVENOUS; SOFT TISSUE at 03:06

## 2021-06-01 RX ADMIN — FLUOROURACIL 2460 MG: 50 INJECTION, SOLUTION INTRAVENOUS at 05:06

## 2021-06-02 ENCOUNTER — PATIENT MESSAGE (OUTPATIENT)
Dept: INFUSION THERAPY | Facility: HOSPITAL | Age: 71
End: 2021-06-02

## 2021-06-03 ENCOUNTER — INFUSION (OUTPATIENT)
Dept: INFUSION THERAPY | Facility: HOSPITAL | Age: 71
End: 2021-06-03
Attending: PHYSICIAN ASSISTANT
Payer: MEDICARE

## 2021-06-03 VITALS
DIASTOLIC BLOOD PRESSURE: 63 MMHG | TEMPERATURE: 98 F | SYSTOLIC BLOOD PRESSURE: 104 MMHG | HEART RATE: 65 BPM | RESPIRATION RATE: 16 BRPM | WEIGHT: 163.13 LBS | BODY MASS INDEX: 21.52 KG/M2

## 2021-06-03 DIAGNOSIS — D49.0 COLORECTAL NEOPLASM: Primary | ICD-10-CM

## 2021-06-03 PROCEDURE — 25000003 PHARM REV CODE 250: Mod: PN | Performed by: PHYSICIAN ASSISTANT

## 2021-06-03 PROCEDURE — 96523 IRRIG DRUG DELIVERY DEVICE: CPT | Mod: PN

## 2021-06-03 PROCEDURE — A4216 STERILE WATER/SALINE, 10 ML: HCPCS | Mod: PN | Performed by: PHYSICIAN ASSISTANT

## 2021-06-03 RX ORDER — SODIUM CHLORIDE 0.9 % (FLUSH) 0.9 %
10 SYRINGE (ML) INJECTION
Status: DISCONTINUED | OUTPATIENT
Start: 2021-06-03 | End: 2021-06-03 | Stop reason: HOSPADM

## 2021-06-03 RX ORDER — HEPARIN 100 UNIT/ML
500 SYRINGE INTRAVENOUS
Status: DISCONTINUED | OUTPATIENT
Start: 2021-06-03 | End: 2021-06-03 | Stop reason: HOSPADM

## 2021-06-03 RX ADMIN — Medication 10 ML: at 03:06

## 2021-06-15 ENCOUNTER — INFUSION (OUTPATIENT)
Dept: INFUSION THERAPY | Facility: HOSPITAL | Age: 71
End: 2021-06-15
Attending: PHYSICIAN ASSISTANT
Payer: MEDICARE

## 2021-06-15 VITALS
SYSTOLIC BLOOD PRESSURE: 142 MMHG | WEIGHT: 170.19 LBS | HEART RATE: 80 BPM | TEMPERATURE: 98 F | HEIGHT: 73 IN | RESPIRATION RATE: 16 BRPM | DIASTOLIC BLOOD PRESSURE: 82 MMHG | BODY MASS INDEX: 22.56 KG/M2

## 2021-06-15 DIAGNOSIS — D49.0 COLORECTAL NEOPLASM: Primary | ICD-10-CM

## 2021-06-15 PROCEDURE — 96416 CHEMO PROLONG INFUSE W/PUMP: CPT | Mod: PN

## 2021-06-15 PROCEDURE — 96415 CHEMO IV INFUSION ADDL HR: CPT | Mod: PN

## 2021-06-15 PROCEDURE — 63600175 PHARM REV CODE 636 W HCPCS: Mod: PN | Performed by: NURSE PRACTITIONER

## 2021-06-15 PROCEDURE — 96367 TX/PROPH/DG ADDL SEQ IV INF: CPT | Mod: PN

## 2021-06-15 PROCEDURE — 96413 CHEMO IV INFUSION 1 HR: CPT | Mod: PN

## 2021-06-15 PROCEDURE — 25000003 PHARM REV CODE 250: Mod: PN | Performed by: NURSE PRACTITIONER

## 2021-06-15 RX ORDER — EPINEPHRINE 0.3 MG/.3ML
0.3 INJECTION SUBCUTANEOUS ONCE AS NEEDED
Status: DISCONTINUED | OUTPATIENT
Start: 2021-06-15 | End: 2021-06-15 | Stop reason: HOSPADM

## 2021-06-15 RX ORDER — DIPHENHYDRAMINE HYDROCHLORIDE 50 MG/ML
50 INJECTION INTRAMUSCULAR; INTRAVENOUS ONCE AS NEEDED
Status: DISCONTINUED | OUTPATIENT
Start: 2021-06-15 | End: 2021-06-15 | Stop reason: HOSPADM

## 2021-06-15 RX ORDER — SODIUM CHLORIDE 0.9 % (FLUSH) 0.9 %
10 SYRINGE (ML) INJECTION
Status: DISCONTINUED | OUTPATIENT
Start: 2021-06-15 | End: 2021-06-15 | Stop reason: HOSPADM

## 2021-06-15 RX ADMIN — PALONOSETRON 0.25 MG: 0.05 INJECTION, SOLUTION INTRAVENOUS at 01:06

## 2021-06-15 RX ADMIN — OXALIPLATIN 174 MG: 100 INJECTION, SOLUTION, CONCENTRATE INTRAVENOUS at 02:06

## 2021-06-15 RX ADMIN — FLUOROURACIL 4920 MG: 50 INJECTION, SOLUTION INTRAVENOUS at 04:06

## 2021-06-15 RX ADMIN — DEXTROSE: 5 SOLUTION INTRAVENOUS at 01:06

## 2021-06-17 ENCOUNTER — INFUSION (OUTPATIENT)
Dept: INFUSION THERAPY | Facility: HOSPITAL | Age: 71
End: 2021-06-17
Attending: PHYSICIAN ASSISTANT
Payer: MEDICARE

## 2021-06-17 VITALS
WEIGHT: 168.63 LBS | TEMPERATURE: 98 F | SYSTOLIC BLOOD PRESSURE: 134 MMHG | HEART RATE: 61 BPM | RESPIRATION RATE: 16 BRPM | DIASTOLIC BLOOD PRESSURE: 80 MMHG | BODY MASS INDEX: 22.25 KG/M2

## 2021-06-17 DIAGNOSIS — D49.0 COLORECTAL NEOPLASM: Primary | ICD-10-CM

## 2021-06-17 PROCEDURE — 96523 IRRIG DRUG DELIVERY DEVICE: CPT | Mod: PN

## 2021-06-17 PROCEDURE — A4216 STERILE WATER/SALINE, 10 ML: HCPCS | Mod: PN | Performed by: NURSE PRACTITIONER

## 2021-06-17 PROCEDURE — 25000003 PHARM REV CODE 250: Mod: PN | Performed by: NURSE PRACTITIONER

## 2021-06-17 RX ORDER — SODIUM CHLORIDE 0.9 % (FLUSH) 0.9 %
10 SYRINGE (ML) INJECTION
Status: DISCONTINUED | OUTPATIENT
Start: 2021-06-17 | End: 2021-06-17 | Stop reason: HOSPADM

## 2021-06-17 RX ORDER — HEPARIN 100 UNIT/ML
500 SYRINGE INTRAVENOUS
Status: DISCONTINUED | OUTPATIENT
Start: 2021-06-17 | End: 2021-06-17 | Stop reason: HOSPADM

## 2021-06-17 RX ADMIN — Medication 10 ML: at 04:06

## 2021-06-28 ENCOUNTER — LAB VISIT (OUTPATIENT)
Dept: LAB | Facility: HOSPITAL | Age: 71
End: 2021-06-28
Attending: PHYSICIAN ASSISTANT
Payer: MEDICARE

## 2021-06-28 DIAGNOSIS — D50.9 IRON DEFICIENCY ANEMIA, UNSPECIFIED IRON DEFICIENCY ANEMIA TYPE: ICD-10-CM

## 2021-06-28 DIAGNOSIS — C20 ADENOCARCINOMA OF RECTUM: ICD-10-CM

## 2021-06-28 LAB
ALBUMIN SERPL BCP-MCNC: 3.4 G/DL (ref 3.5–5.2)
ALP SERPL-CCNC: 83 U/L (ref 55–135)
ALT SERPL W/O P-5'-P-CCNC: 24 U/L (ref 10–44)
ANION GAP SERPL CALC-SCNC: 8 MMOL/L (ref 8–16)
ANISOCYTOSIS BLD QL SMEAR: ABNORMAL
AST SERPL-CCNC: 25 U/L (ref 10–40)
BASOPHILS # BLD AUTO: 0.01 K/UL (ref 0–0.2)
BASOPHILS NFR BLD: 0.2 % (ref 0–1.9)
BILIRUB SERPL-MCNC: 0.5 MG/DL (ref 0.1–1)
BUN SERPL-MCNC: 17 MG/DL (ref 8–23)
CALCIUM SERPL-MCNC: 9.1 MG/DL (ref 8.7–10.5)
CHLORIDE SERPL-SCNC: 108 MMOL/L (ref 95–110)
CO2 SERPL-SCNC: 25 MMOL/L (ref 23–29)
CREAT SERPL-MCNC: 0.9 MG/DL (ref 0.5–1.4)
DACRYOCYTES BLD QL SMEAR: ABNORMAL
DIFFERENTIAL METHOD: ABNORMAL
EOSINOPHIL # BLD AUTO: 0.3 K/UL (ref 0–0.5)
EOSINOPHIL NFR BLD: 4.4 % (ref 0–8)
ERYTHROCYTE [DISTWIDTH] IN BLOOD BY AUTOMATED COUNT: 22.7 % (ref 11.5–14.5)
EST. GFR  (AFRICAN AMERICAN): >60 ML/MIN/1.73 M^2
EST. GFR  (NON AFRICAN AMERICAN): >60 ML/MIN/1.73 M^2
GLUCOSE SERPL-MCNC: 117 MG/DL (ref 70–110)
HCT VFR BLD AUTO: 33.8 % (ref 40–54)
HGB BLD-MCNC: 10.6 G/DL (ref 14–18)
IMM GRANULOCYTES # BLD AUTO: 0.06 K/UL (ref 0–0.04)
IMM GRANULOCYTES NFR BLD AUTO: 1 % (ref 0–0.5)
LYMPHOCYTES # BLD AUTO: 0.5 K/UL (ref 1–4.8)
LYMPHOCYTES NFR BLD: 8.8 % (ref 18–48)
MAGNESIUM SERPL-MCNC: 2 MG/DL (ref 1.6–2.6)
MCH RBC QN AUTO: 29 PG (ref 27–31)
MCHC RBC AUTO-ENTMCNC: 31.4 G/DL (ref 32–36)
MCV RBC AUTO: 92 FL (ref 82–98)
MONOCYTES # BLD AUTO: 0.6 K/UL (ref 0.3–1)
MONOCYTES NFR BLD: 9.6 % (ref 4–15)
NEUTROPHILS # BLD AUTO: 4.5 K/UL (ref 1.8–7.7)
NEUTROPHILS NFR BLD: 76 % (ref 38–73)
NRBC BLD-RTO: 0 /100 WBC
OVALOCYTES BLD QL SMEAR: ABNORMAL
PLATELET # BLD AUTO: 193 K/UL (ref 150–450)
PMV BLD AUTO: 9 FL (ref 9.2–12.9)
POIKILOCYTOSIS BLD QL SMEAR: SLIGHT
POLYCHROMASIA BLD QL SMEAR: ABNORMAL
POTASSIUM SERPL-SCNC: 4.7 MMOL/L (ref 3.5–5.1)
PROT SERPL-MCNC: 6.1 G/DL (ref 6–8.4)
RBC # BLD AUTO: 3.66 M/UL (ref 4.6–6.2)
SODIUM SERPL-SCNC: 141 MMOL/L (ref 136–145)
WBC # BLD AUTO: 5.93 K/UL (ref 3.9–12.7)

## 2021-06-28 PROCEDURE — 83735 ASSAY OF MAGNESIUM: CPT | Mod: PN | Performed by: PHYSICIAN ASSISTANT

## 2021-06-28 PROCEDURE — 80053 COMPREHEN METABOLIC PANEL: CPT | Mod: PN | Performed by: PHYSICIAN ASSISTANT

## 2021-06-28 PROCEDURE — 36415 COLL VENOUS BLD VENIPUNCTURE: CPT | Mod: PN | Performed by: PHYSICIAN ASSISTANT

## 2021-06-28 PROCEDURE — 85025 COMPLETE CBC W/AUTO DIFF WBC: CPT | Mod: PN | Performed by: PHYSICIAN ASSISTANT

## 2021-06-29 ENCOUNTER — INFUSION (OUTPATIENT)
Dept: INFUSION THERAPY | Facility: HOSPITAL | Age: 71
End: 2021-06-29
Attending: PHYSICIAN ASSISTANT
Payer: MEDICARE

## 2021-06-29 ENCOUNTER — DOCUMENTATION ONLY (OUTPATIENT)
Dept: HEMATOLOGY/ONCOLOGY | Facility: CLINIC | Age: 71
End: 2021-06-29

## 2021-06-29 VITALS
RESPIRATION RATE: 18 BRPM | TEMPERATURE: 99 F | SYSTOLIC BLOOD PRESSURE: 156 MMHG | HEIGHT: 73 IN | BODY MASS INDEX: 22.38 KG/M2 | DIASTOLIC BLOOD PRESSURE: 90 MMHG | WEIGHT: 168.88 LBS | HEART RATE: 77 BPM

## 2021-06-29 DIAGNOSIS — D49.0 COLORECTAL NEOPLASM: Primary | ICD-10-CM

## 2021-06-29 PROCEDURE — 96415 CHEMO IV INFUSION ADDL HR: CPT | Mod: PN

## 2021-06-29 PROCEDURE — 25000003 PHARM REV CODE 250: Mod: PN | Performed by: PHYSICIAN ASSISTANT

## 2021-06-29 PROCEDURE — 96416 CHEMO PROLONG INFUSE W/PUMP: CPT | Mod: PN

## 2021-06-29 PROCEDURE — 63600175 PHARM REV CODE 636 W HCPCS: Mod: JG,PN | Performed by: PHYSICIAN ASSISTANT

## 2021-06-29 PROCEDURE — 96417 CHEMO IV INFUS EACH ADDL SEQ: CPT | Mod: PN

## 2021-06-29 PROCEDURE — 96413 CHEMO IV INFUSION 1 HR: CPT | Mod: PN

## 2021-06-29 PROCEDURE — 96367 TX/PROPH/DG ADDL SEQ IV INF: CPT | Mod: PN

## 2021-06-29 RX ORDER — SODIUM CHLORIDE 0.9 % (FLUSH) 0.9 %
10 SYRINGE (ML) INJECTION
Status: DISCONTINUED | OUTPATIENT
Start: 2021-06-29 | End: 2021-06-29 | Stop reason: HOSPADM

## 2021-06-29 RX ORDER — EPINEPHRINE 0.3 MG/.3ML
0.3 INJECTION SUBCUTANEOUS ONCE AS NEEDED
Status: DISCONTINUED | OUTPATIENT
Start: 2021-06-29 | End: 2021-06-29 | Stop reason: HOSPADM

## 2021-06-29 RX ORDER — HEPARIN 100 UNIT/ML
500 SYRINGE INTRAVENOUS
Status: DISCONTINUED | OUTPATIENT
Start: 2021-06-29 | End: 2021-06-29 | Stop reason: HOSPADM

## 2021-06-29 RX ORDER — DIPHENHYDRAMINE HYDROCHLORIDE 50 MG/ML
50 INJECTION INTRAMUSCULAR; INTRAVENOUS ONCE AS NEEDED
Status: DISCONTINUED | OUTPATIENT
Start: 2021-06-29 | End: 2021-06-29 | Stop reason: HOSPADM

## 2021-06-29 RX ADMIN — BEVACIZUMAB 400 MG: 400 INJECTION, SOLUTION INTRAVENOUS at 04:06

## 2021-06-29 RX ADMIN — DEXTROSE: 5 SOLUTION INTRAVENOUS at 01:06

## 2021-06-29 RX ADMIN — FLUOROURACIL 4920 MG: 50 INJECTION, SOLUTION INTRAVENOUS at 05:06

## 2021-06-29 RX ADMIN — OXALIPLATIN 174 MG: 5 INJECTION, SOLUTION INTRAVENOUS at 02:06

## 2021-06-29 RX ADMIN — SODIUM CHLORIDE: 9 INJECTION, SOLUTION INTRAVENOUS at 04:06

## 2021-06-29 RX ADMIN — PALONOSETRON HYDROCHLORIDE 0.25 MG: 0.25 INJECTION, SOLUTION INTRAVENOUS at 01:06

## 2021-07-01 ENCOUNTER — INFUSION (OUTPATIENT)
Dept: INFUSION THERAPY | Facility: HOSPITAL | Age: 71
End: 2021-07-01
Attending: PHYSICIAN ASSISTANT
Payer: MEDICARE

## 2021-07-01 VITALS
SYSTOLIC BLOOD PRESSURE: 132 MMHG | HEART RATE: 68 BPM | TEMPERATURE: 98 F | DIASTOLIC BLOOD PRESSURE: 71 MMHG | RESPIRATION RATE: 18 BRPM

## 2021-07-01 DIAGNOSIS — D49.0 COLORECTAL NEOPLASM: Primary | ICD-10-CM

## 2021-07-01 PROCEDURE — 96523 IRRIG DRUG DELIVERY DEVICE: CPT | Mod: PN

## 2021-07-01 PROCEDURE — 25000003 PHARM REV CODE 250: Mod: PN | Performed by: PHYSICIAN ASSISTANT

## 2021-07-01 PROCEDURE — A4216 STERILE WATER/SALINE, 10 ML: HCPCS | Mod: PN | Performed by: PHYSICIAN ASSISTANT

## 2021-07-01 RX ORDER — SODIUM CHLORIDE 0.9 % (FLUSH) 0.9 %
10 SYRINGE (ML) INJECTION
Status: DISCONTINUED | OUTPATIENT
Start: 2021-07-01 | End: 2021-07-01 | Stop reason: HOSPADM

## 2021-07-01 RX ADMIN — Medication 10 ML: at 04:07

## 2021-07-08 ENCOUNTER — LAB VISIT (OUTPATIENT)
Dept: LAB | Facility: HOSPITAL | Age: 71
End: 2021-07-08
Payer: MEDICARE

## 2021-07-08 DIAGNOSIS — C20 MALIGNANT NEOPLASM OF RECTUM: Primary | ICD-10-CM

## 2021-07-08 LAB
BASOPHILS # BLD AUTO: 0.02 K/UL (ref 0–0.2)
BASOPHILS NFR BLD: 0.4 % (ref 0–1.9)
DIFFERENTIAL METHOD: ABNORMAL
EOSINOPHIL # BLD AUTO: 0.3 K/UL (ref 0–0.5)
EOSINOPHIL NFR BLD: 5.5 % (ref 0–8)
ERYTHROCYTE [DISTWIDTH] IN BLOOD BY AUTOMATED COUNT: 22.4 % (ref 11.5–14.5)
HCT VFR BLD AUTO: 38.1 % (ref 40–54)
HGB BLD-MCNC: 12.1 G/DL (ref 14–18)
IMM GRANULOCYTES # BLD AUTO: 0.11 K/UL (ref 0–0.04)
IMM GRANULOCYTES NFR BLD AUTO: 2.2 % (ref 0–0.5)
LYMPHOCYTES # BLD AUTO: 0.7 K/UL (ref 1–4.8)
LYMPHOCYTES NFR BLD: 13.8 % (ref 18–48)
MCH RBC QN AUTO: 29.4 PG (ref 27–31)
MCHC RBC AUTO-ENTMCNC: 31.8 G/DL (ref 32–36)
MCV RBC AUTO: 93 FL (ref 82–98)
MONOCYTES # BLD AUTO: 0.6 K/UL (ref 0.3–1)
MONOCYTES NFR BLD: 12 % (ref 4–15)
NEUTROPHILS # BLD AUTO: 3.2 K/UL (ref 1.8–7.7)
NEUTROPHILS NFR BLD: 66.1 % (ref 38–73)
NRBC BLD-RTO: 0 /100 WBC
PLATELET # BLD AUTO: 264 K/UL (ref 150–450)
PMV BLD AUTO: 9.3 FL (ref 9.2–12.9)
RBC # BLD AUTO: 4.11 M/UL (ref 4.6–6.2)
WBC # BLD AUTO: 4.91 K/UL (ref 3.9–12.7)

## 2021-07-08 PROCEDURE — 36415 COLL VENOUS BLD VENIPUNCTURE: CPT | Mod: PN

## 2021-07-08 PROCEDURE — 85025 COMPLETE CBC W/AUTO DIFF WBC: CPT | Mod: PN

## 2021-07-13 ENCOUNTER — LAB VISIT (OUTPATIENT)
Dept: LAB | Facility: HOSPITAL | Age: 71
End: 2021-07-13
Attending: INTERNAL MEDICINE
Payer: MEDICARE

## 2021-07-13 DIAGNOSIS — D50.9 IRON DEFICIENCY ANEMIA, UNSPECIFIED IRON DEFICIENCY ANEMIA TYPE: ICD-10-CM

## 2021-07-13 DIAGNOSIS — C20 ADENOCARCINOMA OF RECTUM: ICD-10-CM

## 2021-07-13 LAB
ALBUMIN SERPL BCP-MCNC: 3.7 G/DL (ref 3.5–5.2)
ALP SERPL-CCNC: 93 U/L (ref 55–135)
ALT SERPL W/O P-5'-P-CCNC: 31 U/L (ref 10–44)
ANION GAP SERPL CALC-SCNC: 10 MMOL/L (ref 8–16)
AST SERPL-CCNC: 29 U/L (ref 10–40)
BASOPHILS # BLD AUTO: 0.01 K/UL (ref 0–0.2)
BASOPHILS NFR BLD: 0.2 % (ref 0–1.9)
BILIRUB SERPL-MCNC: 0.8 MG/DL (ref 0.1–1)
BUN SERPL-MCNC: 21 MG/DL (ref 8–23)
CALCIUM SERPL-MCNC: 9.7 MG/DL (ref 8.7–10.5)
CHLORIDE SERPL-SCNC: 106 MMOL/L (ref 95–110)
CO2 SERPL-SCNC: 24 MMOL/L (ref 23–29)
CREAT SERPL-MCNC: 0.9 MG/DL (ref 0.5–1.4)
DIFFERENTIAL METHOD: ABNORMAL
EOSINOPHIL # BLD AUTO: 0.3 K/UL (ref 0–0.5)
EOSINOPHIL NFR BLD: 5 % (ref 0–8)
ERYTHROCYTE [DISTWIDTH] IN BLOOD BY AUTOMATED COUNT: 22.5 % (ref 11.5–14.5)
EST. GFR  (AFRICAN AMERICAN): >60 ML/MIN/1.73 M^2
EST. GFR  (NON AFRICAN AMERICAN): >60 ML/MIN/1.73 M^2
GLUCOSE SERPL-MCNC: 121 MG/DL (ref 70–110)
HCT VFR BLD AUTO: 36.9 % (ref 40–54)
HGB BLD-MCNC: 11.7 G/DL (ref 14–18)
IMM GRANULOCYTES # BLD AUTO: 0.08 K/UL (ref 0–0.04)
IMM GRANULOCYTES NFR BLD AUTO: 1.2 % (ref 0–0.5)
LYMPHOCYTES # BLD AUTO: 0.7 K/UL (ref 1–4.8)
LYMPHOCYTES NFR BLD: 10 % (ref 18–48)
MAGNESIUM SERPL-MCNC: 2 MG/DL (ref 1.6–2.6)
MCH RBC QN AUTO: 29.6 PG (ref 27–31)
MCHC RBC AUTO-ENTMCNC: 31.7 G/DL (ref 32–36)
MCV RBC AUTO: 93 FL (ref 82–98)
MONOCYTES # BLD AUTO: 0.7 K/UL (ref 0.3–1)
MONOCYTES NFR BLD: 10.9 % (ref 4–15)
NEUTROPHILS # BLD AUTO: 4.8 K/UL (ref 1.8–7.7)
NEUTROPHILS NFR BLD: 72.7 % (ref 38–73)
NRBC BLD-RTO: 0 /100 WBC
PLATELET # BLD AUTO: 172 K/UL (ref 150–450)
PMV BLD AUTO: 8.8 FL (ref 9.2–12.9)
POTASSIUM SERPL-SCNC: 4.5 MMOL/L (ref 3.5–5.1)
PROT SERPL-MCNC: 6.9 G/DL (ref 6–8.4)
RBC # BLD AUTO: 3.95 M/UL (ref 4.6–6.2)
SODIUM SERPL-SCNC: 140 MMOL/L (ref 136–145)
WBC # BLD AUTO: 6.59 K/UL (ref 3.9–12.7)

## 2021-07-13 PROCEDURE — 80053 COMPREHEN METABOLIC PANEL: CPT | Mod: PN | Performed by: PHYSICIAN ASSISTANT

## 2021-07-13 PROCEDURE — 36415 COLL VENOUS BLD VENIPUNCTURE: CPT | Mod: PN | Performed by: PHYSICIAN ASSISTANT

## 2021-07-13 PROCEDURE — 85025 COMPLETE CBC W/AUTO DIFF WBC: CPT | Mod: PN | Performed by: PHYSICIAN ASSISTANT

## 2021-07-13 PROCEDURE — 83735 ASSAY OF MAGNESIUM: CPT | Mod: PN | Performed by: PHYSICIAN ASSISTANT

## 2021-09-03 ENCOUNTER — LAB VISIT (OUTPATIENT)
Dept: LAB | Facility: HOSPITAL | Age: 71
End: 2021-09-03
Attending: INTERNAL MEDICINE
Payer: MEDICARE

## 2021-09-03 DIAGNOSIS — C20 ADENOCARCINOMA OF RECTUM: ICD-10-CM

## 2021-09-03 LAB
ALBUMIN SERPL BCP-MCNC: 3.5 G/DL (ref 3.5–5.2)
ALP SERPL-CCNC: 89 U/L (ref 55–135)
ALT SERPL W/O P-5'-P-CCNC: 22 U/L (ref 10–44)
ANION GAP SERPL CALC-SCNC: 9 MMOL/L (ref 8–16)
AST SERPL-CCNC: 31 U/L (ref 10–40)
BASOPHILS # BLD AUTO: 0 K/UL (ref 0–0.2)
BASOPHILS NFR BLD: 0 % (ref 0–1.9)
BILIRUB SERPL-MCNC: 0.8 MG/DL (ref 0.1–1)
BUN SERPL-MCNC: 20 MG/DL (ref 8–23)
CALCIUM SERPL-MCNC: 9.7 MG/DL (ref 8.7–10.5)
CHLORIDE SERPL-SCNC: 107 MMOL/L (ref 95–110)
CO2 SERPL-SCNC: 24 MMOL/L (ref 23–29)
CREAT SERPL-MCNC: 1 MG/DL (ref 0.5–1.4)
DIFFERENTIAL METHOD: ABNORMAL
EOSINOPHIL # BLD AUTO: 0.1 K/UL (ref 0–0.5)
EOSINOPHIL NFR BLD: 3 % (ref 0–8)
ERYTHROCYTE [DISTWIDTH] IN BLOOD BY AUTOMATED COUNT: 19.4 % (ref 11.5–14.5)
EST. GFR  (AFRICAN AMERICAN): >60 ML/MIN/1.73 M^2
EST. GFR  (NON AFRICAN AMERICAN): >60 ML/MIN/1.73 M^2
GLUCOSE SERPL-MCNC: 109 MG/DL (ref 70–110)
HCT VFR BLD AUTO: 31.6 % (ref 40–54)
HGB BLD-MCNC: 10.5 G/DL (ref 14–18)
IMM GRANULOCYTES # BLD AUTO: 0.06 K/UL (ref 0–0.04)
IMM GRANULOCYTES NFR BLD AUTO: 1.4 % (ref 0–0.5)
LYMPHOCYTES # BLD AUTO: 0.6 K/UL (ref 1–4.8)
LYMPHOCYTES NFR BLD: 13.5 % (ref 18–48)
MAGNESIUM SERPL-MCNC: 2.1 MG/DL (ref 1.6–2.6)
MCH RBC QN AUTO: 33.9 PG (ref 27–31)
MCHC RBC AUTO-ENTMCNC: 33.2 G/DL (ref 32–36)
MCV RBC AUTO: 102 FL (ref 82–98)
MONOCYTES # BLD AUTO: 0.7 K/UL (ref 0.3–1)
MONOCYTES NFR BLD: 15.1 % (ref 4–15)
NEUTROPHILS # BLD AUTO: 2.9 K/UL (ref 1.8–7.7)
NEUTROPHILS NFR BLD: 67 % (ref 38–73)
NRBC BLD-RTO: 0 /100 WBC
PLATELET # BLD AUTO: 190 K/UL (ref 150–450)
PMV BLD AUTO: 8.8 FL (ref 9.2–12.9)
POTASSIUM SERPL-SCNC: 4.6 MMOL/L (ref 3.5–5.1)
PROT SERPL-MCNC: 6.8 G/DL (ref 6–8.4)
RBC # BLD AUTO: 3.1 M/UL (ref 4.6–6.2)
SODIUM SERPL-SCNC: 140 MMOL/L (ref 136–145)
WBC # BLD AUTO: 4.37 K/UL (ref 3.9–12.7)

## 2021-09-03 PROCEDURE — 36415 COLL VENOUS BLD VENIPUNCTURE: CPT | Mod: PN | Performed by: INTERNAL MEDICINE

## 2021-09-03 PROCEDURE — 80053 COMPREHEN METABOLIC PANEL: CPT | Mod: PN | Performed by: INTERNAL MEDICINE

## 2021-09-03 PROCEDURE — 83735 ASSAY OF MAGNESIUM: CPT | Mod: PN | Performed by: INTERNAL MEDICINE

## 2021-09-03 PROCEDURE — 85025 COMPLETE CBC W/AUTO DIFF WBC: CPT | Mod: PN | Performed by: INTERNAL MEDICINE

## 2021-11-23 PROBLEM — T45.1X5A PERIPHERAL NEUROPATHY DUE TO CHEMOTHERAPY: Chronic | Status: ACTIVE | Noted: 2021-11-23

## 2021-11-23 PROBLEM — G62.0 PERIPHERAL NEUROPATHY DUE TO CHEMOTHERAPY: Chronic | Status: ACTIVE | Noted: 2021-11-23

## 2021-12-07 ENCOUNTER — LAB VISIT (OUTPATIENT)
Dept: LAB | Facility: HOSPITAL | Age: 71
End: 2021-12-07
Attending: PHYSICIAN ASSISTANT
Payer: MEDICARE

## 2021-12-07 DIAGNOSIS — C20 ADENOCARCINOMA OF RECTUM: ICD-10-CM

## 2021-12-07 DIAGNOSIS — D50.9 IRON DEFICIENCY ANEMIA, UNSPECIFIED IRON DEFICIENCY ANEMIA TYPE: ICD-10-CM

## 2021-12-07 LAB
ALBUMIN SERPL BCP-MCNC: 3.9 G/DL (ref 3.5–5.2)
ALP SERPL-CCNC: 86 U/L (ref 55–135)
ALT SERPL W/O P-5'-P-CCNC: 30 U/L (ref 10–44)
ANION GAP SERPL CALC-SCNC: 9 MMOL/L (ref 8–16)
AST SERPL-CCNC: 36 U/L (ref 10–40)
BASOPHILS # BLD AUTO: 0.01 K/UL (ref 0–0.2)
BASOPHILS NFR BLD: 0.2 % (ref 0–1.9)
BILIRUB SERPL-MCNC: 1.3 MG/DL (ref 0.1–1)
BUN SERPL-MCNC: 25 MG/DL (ref 8–23)
CALCIUM SERPL-MCNC: 9.7 MG/DL (ref 8.7–10.5)
CHLORIDE SERPL-SCNC: 103 MMOL/L (ref 95–110)
CO2 SERPL-SCNC: 24 MMOL/L (ref 23–29)
CREAT SERPL-MCNC: 1 MG/DL (ref 0.5–1.4)
DIFFERENTIAL METHOD: ABNORMAL
EOSINOPHIL # BLD AUTO: 0.3 K/UL (ref 0–0.5)
EOSINOPHIL NFR BLD: 5.5 % (ref 0–8)
ERYTHROCYTE [DISTWIDTH] IN BLOOD BY AUTOMATED COUNT: 15.3 % (ref 11.5–14.5)
EST. GFR  (AFRICAN AMERICAN): >60 ML/MIN/1.73 M^2
EST. GFR  (NON AFRICAN AMERICAN): >60 ML/MIN/1.73 M^2
GLUCOSE SERPL-MCNC: 107 MG/DL (ref 70–110)
HCT VFR BLD AUTO: 40.1 % (ref 40–54)
HGB BLD-MCNC: 13.4 G/DL (ref 14–18)
IMM GRANULOCYTES # BLD AUTO: 0.02 K/UL (ref 0–0.04)
IMM GRANULOCYTES NFR BLD AUTO: 0.4 % (ref 0–0.5)
LYMPHOCYTES # BLD AUTO: 0.8 K/UL (ref 1–4.8)
LYMPHOCYTES NFR BLD: 15.2 % (ref 18–48)
MCH RBC QN AUTO: 35.3 PG (ref 27–31)
MCHC RBC AUTO-ENTMCNC: 33.4 G/DL (ref 32–36)
MCV RBC AUTO: 106 FL (ref 82–98)
MONOCYTES # BLD AUTO: 0.4 K/UL (ref 0.3–1)
MONOCYTES NFR BLD: 7.8 % (ref 4–15)
NEUTROPHILS # BLD AUTO: 3.6 K/UL (ref 1.8–7.7)
NEUTROPHILS NFR BLD: 70.9 % (ref 38–73)
NRBC BLD-RTO: 0 /100 WBC
PLATELET # BLD AUTO: 197 K/UL (ref 150–450)
PMV BLD AUTO: 9.7 FL (ref 9.2–12.9)
POTASSIUM SERPL-SCNC: 4.2 MMOL/L (ref 3.5–5.1)
PROT SERPL-MCNC: 7.3 G/DL (ref 6–8.4)
RBC # BLD AUTO: 3.8 M/UL (ref 4.6–6.2)
SODIUM SERPL-SCNC: 136 MMOL/L (ref 136–145)
WBC # BLD AUTO: 5.12 K/UL (ref 3.9–12.7)

## 2021-12-07 PROCEDURE — 36415 COLL VENOUS BLD VENIPUNCTURE: CPT | Mod: PN | Performed by: PHYSICIAN ASSISTANT

## 2021-12-07 PROCEDURE — 85025 COMPLETE CBC W/AUTO DIFF WBC: CPT | Mod: PN | Performed by: PHYSICIAN ASSISTANT

## 2021-12-07 PROCEDURE — 80053 COMPREHEN METABOLIC PANEL: CPT | Mod: PN | Performed by: PHYSICIAN ASSISTANT

## 2022-02-10 ENCOUNTER — OFFICE VISIT (OUTPATIENT)
Dept: PODIATRY | Facility: CLINIC | Age: 72
End: 2022-02-10
Payer: MEDICARE

## 2022-02-10 VITALS — BODY MASS INDEX: 19.55 KG/M2 | HEIGHT: 73 IN | WEIGHT: 147.5 LBS

## 2022-02-10 DIAGNOSIS — S90.211A SUBUNGUAL HEMATOMA OF GREAT TOE OF RIGHT FOOT, INITIAL ENCOUNTER: ICD-10-CM

## 2022-02-10 DIAGNOSIS — S90.211A CONTUSION OF RIGHT GREAT TOE WITH DAMAGE TO NAIL, INITIAL ENCOUNTER: Primary | ICD-10-CM

## 2022-02-10 PROCEDURE — 3288F FALL RISK ASSESSMENT DOCD: CPT | Mod: CPTII,S$GLB,, | Performed by: STUDENT IN AN ORGANIZED HEALTH CARE EDUCATION/TRAINING PROGRAM

## 2022-02-10 PROCEDURE — 1160F PR REVIEW ALL MEDS BY PRESCRIBER/CLIN PHARMACIST DOCUMENTED: ICD-10-PCS | Mod: CPTII,S$GLB,, | Performed by: STUDENT IN AN ORGANIZED HEALTH CARE EDUCATION/TRAINING PROGRAM

## 2022-02-10 PROCEDURE — 1159F PR MEDICATION LIST DOCUMENTED IN MEDICAL RECORD: ICD-10-PCS | Mod: CPTII,S$GLB,, | Performed by: STUDENT IN AN ORGANIZED HEALTH CARE EDUCATION/TRAINING PROGRAM

## 2022-02-10 PROCEDURE — 1125F PR PAIN SEVERITY QUANTIFIED, PAIN PRESENT: ICD-10-PCS | Mod: CPTII,S$GLB,, | Performed by: STUDENT IN AN ORGANIZED HEALTH CARE EDUCATION/TRAINING PROGRAM

## 2022-02-10 PROCEDURE — 11730 NAIL REMOVAL: ICD-10-PCS | Mod: T5,S$GLB,, | Performed by: STUDENT IN AN ORGANIZED HEALTH CARE EDUCATION/TRAINING PROGRAM

## 2022-02-10 PROCEDURE — 99999 PR PBB SHADOW E&M-EST. PATIENT-LVL III: CPT | Mod: PBBFAC,,, | Performed by: STUDENT IN AN ORGANIZED HEALTH CARE EDUCATION/TRAINING PROGRAM

## 2022-02-10 PROCEDURE — 1160F RVW MEDS BY RX/DR IN RCRD: CPT | Mod: CPTII,S$GLB,, | Performed by: STUDENT IN AN ORGANIZED HEALTH CARE EDUCATION/TRAINING PROGRAM

## 2022-02-10 PROCEDURE — 11730 AVULSION NAIL PLATE SIMPLE 1: CPT | Mod: T5,S$GLB,, | Performed by: STUDENT IN AN ORGANIZED HEALTH CARE EDUCATION/TRAINING PROGRAM

## 2022-02-10 PROCEDURE — 99203 PR OFFICE/OUTPT VISIT, NEW, LEVL III, 30-44 MIN: ICD-10-PCS | Mod: 25,S$GLB,, | Performed by: STUDENT IN AN ORGANIZED HEALTH CARE EDUCATION/TRAINING PROGRAM

## 2022-02-10 PROCEDURE — 3008F PR BODY MASS INDEX (BMI) DOCUMENTED: ICD-10-PCS | Mod: CPTII,S$GLB,, | Performed by: STUDENT IN AN ORGANIZED HEALTH CARE EDUCATION/TRAINING PROGRAM

## 2022-02-10 PROCEDURE — 1101F PR PT FALLS ASSESS DOC 0-1 FALLS W/OUT INJ PAST YR: ICD-10-PCS | Mod: CPTII,S$GLB,, | Performed by: STUDENT IN AN ORGANIZED HEALTH CARE EDUCATION/TRAINING PROGRAM

## 2022-02-10 PROCEDURE — 99203 OFFICE O/P NEW LOW 30 MIN: CPT | Mod: 25,S$GLB,, | Performed by: STUDENT IN AN ORGANIZED HEALTH CARE EDUCATION/TRAINING PROGRAM

## 2022-02-10 PROCEDURE — 3288F PR FALLS RISK ASSESSMENT DOCUMENTED: ICD-10-PCS | Mod: CPTII,S$GLB,, | Performed by: STUDENT IN AN ORGANIZED HEALTH CARE EDUCATION/TRAINING PROGRAM

## 2022-02-10 PROCEDURE — 1101F PT FALLS ASSESS-DOCD LE1/YR: CPT | Mod: CPTII,S$GLB,, | Performed by: STUDENT IN AN ORGANIZED HEALTH CARE EDUCATION/TRAINING PROGRAM

## 2022-02-10 PROCEDURE — 99999 PR PBB SHADOW E&M-EST. PATIENT-LVL III: ICD-10-PCS | Mod: PBBFAC,,, | Performed by: STUDENT IN AN ORGANIZED HEALTH CARE EDUCATION/TRAINING PROGRAM

## 2022-02-10 PROCEDURE — 1125F AMNT PAIN NOTED PAIN PRSNT: CPT | Mod: CPTII,S$GLB,, | Performed by: STUDENT IN AN ORGANIZED HEALTH CARE EDUCATION/TRAINING PROGRAM

## 2022-02-10 PROCEDURE — 1159F MED LIST DOCD IN RCRD: CPT | Mod: CPTII,S$GLB,, | Performed by: STUDENT IN AN ORGANIZED HEALTH CARE EDUCATION/TRAINING PROGRAM

## 2022-02-10 PROCEDURE — 3008F BODY MASS INDEX DOCD: CPT | Mod: CPTII,S$GLB,, | Performed by: STUDENT IN AN ORGANIZED HEALTH CARE EDUCATION/TRAINING PROGRAM

## 2022-02-10 NOTE — PROGRESS NOTES
Subjective:      Patient ID: Greg Santana is a 71 y.o. male.    Chief Complaint: Nail Problem    Patient had a recent traumatic fall causing injury to his face and right fingers and great toe. The great toe has been very painful to touch and with walking. He'd like it evaluated today.     Review of Systems   Musculoskeletal:        Pain right great toe   All other systems reviewed and are negative.          Objective:      Physical Exam  Cardiovascular:      Pulses:           Dorsalis pedis pulses are 1+ on the right side.        Posterior tibial pulses are 1+ on the right side.   Musculoskeletal:      Right lower leg: No edema.   Feet:      Comments: Right toe is painful to touch  Skin:     General: Skin is warm.      Capillary Refill: Capillary refill takes less than 2 seconds.      Comments: Large subungual hematoma of the R hallux nail. The nail bed is very loose and painful. No erythema or edema.     No nail bed laceration after removal of the R toe nail.                Assessment:       Encounter Diagnoses   Name Primary?    Contusion of right great toe with damage to nail, initial encounter Yes    Subungual hematoma of great toe of right foot, initial encounter          Plan:       Greg was seen today for nail problem.    Diagnoses and all orders for this visit:    Contusion of right great toe with damage to nail, initial encounter    Subungual hematoma of great toe of right foot, initial encounter      I counseled the patient on his conditions, their implications and medical management.    Nail procedure performed as below. Copious bloody serous drainage from the nail bed and surrounding skin. No appreciable nail bed damage.    Post op instructions discussed.    F/u 1 week    Geovanny Rose DPM    Nail Removal    Date/Time: 2/10/2022 2:20 PM  Performed by: Geovanny Rose DPM  Authorized by: Geovanny Rose DPM     Location:     Location:  Right foot    Location detail:  Right big toe  Anesthesia:      Anesthesia:  Digital block    Local anesthetic:  Lidocaine 1% without epinephrine and bupivacaine 0.5% without epinephrine    Anesthetic total (ml):  5  Procedure Details:     Preparation:  Skin prepped with ChloraPrep    Amount removed:  Complete    Wedge excision of skin of nail fold: No      Nail bed sutured?: No      Nail matrix removed:  None    Removed nail replaced and anchored: No      Dressing applied:  4x4 and antibiotic ointment    Patient tolerance:  Patient tolerated the procedure well with no immediate complications

## 2022-02-18 ENCOUNTER — OFFICE VISIT (OUTPATIENT)
Dept: PODIATRY | Facility: CLINIC | Age: 72
End: 2022-02-18
Payer: MEDICARE

## 2022-02-18 ENCOUNTER — HOSPITAL ENCOUNTER (OUTPATIENT)
Dept: RADIOLOGY | Facility: HOSPITAL | Age: 72
Discharge: HOME | End: 2022-02-18
Attending: STUDENT IN AN ORGANIZED HEALTH CARE EDUCATION/TRAINING PROGRAM
Payer: MEDICARE

## 2022-02-18 VITALS — HEIGHT: 73 IN | WEIGHT: 147.5 LBS | BODY MASS INDEX: 19.55 KG/M2

## 2022-02-18 DIAGNOSIS — S90.211A CONTUSION OF RIGHT GREAT TOE WITH DAMAGE TO NAIL, INITIAL ENCOUNTER: ICD-10-CM

## 2022-02-18 DIAGNOSIS — S90.211A CONTUSION OF RIGHT GREAT TOE WITH DAMAGE TO NAIL, INITIAL ENCOUNTER: Primary | ICD-10-CM

## 2022-02-18 PROCEDURE — 99999 PR PBB SHADOW E&M-EST. PATIENT-LVL III: CPT | Mod: PBBFAC,,, | Performed by: STUDENT IN AN ORGANIZED HEALTH CARE EDUCATION/TRAINING PROGRAM

## 2022-02-18 PROCEDURE — 73630 X-RAY EXAM OF FOOT: CPT | Mod: TC,PO,RT

## 2022-02-18 PROCEDURE — 73630 X-RAY EXAM OF FOOT: CPT | Mod: 26,RT,, | Performed by: RADIOLOGY

## 2022-02-18 PROCEDURE — 3288F PR FALLS RISK ASSESSMENT DOCUMENTED: ICD-10-PCS | Mod: CPTII,S$GLB,, | Performed by: STUDENT IN AN ORGANIZED HEALTH CARE EDUCATION/TRAINING PROGRAM

## 2022-02-18 PROCEDURE — 1125F PR PAIN SEVERITY QUANTIFIED, PAIN PRESENT: ICD-10-PCS | Mod: CPTII,S$GLB,, | Performed by: STUDENT IN AN ORGANIZED HEALTH CARE EDUCATION/TRAINING PROGRAM

## 2022-02-18 PROCEDURE — 1101F PT FALLS ASSESS-DOCD LE1/YR: CPT | Mod: CPTII,S$GLB,, | Performed by: STUDENT IN AN ORGANIZED HEALTH CARE EDUCATION/TRAINING PROGRAM

## 2022-02-18 PROCEDURE — 3008F BODY MASS INDEX DOCD: CPT | Mod: CPTII,S$GLB,, | Performed by: STUDENT IN AN ORGANIZED HEALTH CARE EDUCATION/TRAINING PROGRAM

## 2022-02-18 PROCEDURE — 1159F MED LIST DOCD IN RCRD: CPT | Mod: CPTII,S$GLB,, | Performed by: STUDENT IN AN ORGANIZED HEALTH CARE EDUCATION/TRAINING PROGRAM

## 2022-02-18 PROCEDURE — 73630 XR FOOT COMPLETE 3 VIEW RIGHT: ICD-10-PCS | Mod: 26,RT,, | Performed by: RADIOLOGY

## 2022-02-18 PROCEDURE — 1160F RVW MEDS BY RX/DR IN RCRD: CPT | Mod: CPTII,S$GLB,, | Performed by: STUDENT IN AN ORGANIZED HEALTH CARE EDUCATION/TRAINING PROGRAM

## 2022-02-18 PROCEDURE — 99024 PR POST-OP FOLLOW-UP VISIT: ICD-10-PCS | Mod: S$GLB,,, | Performed by: STUDENT IN AN ORGANIZED HEALTH CARE EDUCATION/TRAINING PROGRAM

## 2022-02-18 PROCEDURE — 99024 POSTOP FOLLOW-UP VISIT: CPT | Mod: S$GLB,,, | Performed by: STUDENT IN AN ORGANIZED HEALTH CARE EDUCATION/TRAINING PROGRAM

## 2022-02-18 PROCEDURE — 1101F PR PT FALLS ASSESS DOC 0-1 FALLS W/OUT INJ PAST YR: ICD-10-PCS | Mod: CPTII,S$GLB,, | Performed by: STUDENT IN AN ORGANIZED HEALTH CARE EDUCATION/TRAINING PROGRAM

## 2022-02-18 PROCEDURE — 99999 PR PBB SHADOW E&M-EST. PATIENT-LVL III: ICD-10-PCS | Mod: PBBFAC,,, | Performed by: STUDENT IN AN ORGANIZED HEALTH CARE EDUCATION/TRAINING PROGRAM

## 2022-02-18 PROCEDURE — 3288F FALL RISK ASSESSMENT DOCD: CPT | Mod: CPTII,S$GLB,, | Performed by: STUDENT IN AN ORGANIZED HEALTH CARE EDUCATION/TRAINING PROGRAM

## 2022-02-18 PROCEDURE — 1160F PR REVIEW ALL MEDS BY PRESCRIBER/CLIN PHARMACIST DOCUMENTED: ICD-10-PCS | Mod: CPTII,S$GLB,, | Performed by: STUDENT IN AN ORGANIZED HEALTH CARE EDUCATION/TRAINING PROGRAM

## 2022-02-18 PROCEDURE — 1159F PR MEDICATION LIST DOCUMENTED IN MEDICAL RECORD: ICD-10-PCS | Mod: CPTII,S$GLB,, | Performed by: STUDENT IN AN ORGANIZED HEALTH CARE EDUCATION/TRAINING PROGRAM

## 2022-02-18 PROCEDURE — 3008F PR BODY MASS INDEX (BMI) DOCUMENTED: ICD-10-PCS | Mod: CPTII,S$GLB,, | Performed by: STUDENT IN AN ORGANIZED HEALTH CARE EDUCATION/TRAINING PROGRAM

## 2022-02-18 PROCEDURE — 1125F AMNT PAIN NOTED PAIN PRSNT: CPT | Mod: CPTII,S$GLB,, | Performed by: STUDENT IN AN ORGANIZED HEALTH CARE EDUCATION/TRAINING PROGRAM

## 2022-02-18 RX ORDER — DOXYCYCLINE HYCLATE 100 MG
100 TABLET ORAL 2 TIMES DAILY
Qty: 14 TABLET | Refills: 0 | Status: SHIPPED | OUTPATIENT
Start: 2022-02-18 | End: 2022-02-25

## 2022-02-18 NOTE — PROGRESS NOTES
Subjective:      Patient ID: Greg Santana is a 71 y.o. male.    Chief Complaint: Follow-up    Patient had a recent traumatic fall causing injury to his face and right fingers and great toe. The great toe has been very painful to touch and with walking. He'd like it evaluated today.     02/18/2022  Patient returns with continued pain to the great toe of the right foot s/p total nail avulsion. Denies any F/N/V/C.     Review of Systems   Musculoskeletal:        Pain right great toe   All other systems reviewed and are negative.          Objective:      Physical Exam  Cardiovascular:      Pulses:           Dorsalis pedis pulses are 1+ on the right side.        Posterior tibial pulses are 1+ on the right side.   Musculoskeletal:      Right lower leg: No edema.   Feet:      Comments: Right toe is painful to touch  Skin:     General: Skin is warm.      Capillary Refill: Capillary refill takes less than 2 seconds.      Comments: Interval changes to the right great toe. Nail has been avulsed. The procedure site is healthy with minor erythema. It continues to be tender to touch. No purulence, fluctuance or crepitus appreciated. No gangrenous or duskiness.                 Assessment:       Encounter Diagnosis   Name Primary?    Contusion of right great toe with damage to nail, initial encounter Yes         Plan:       Greg was seen today for follow-up.    Diagnoses and all orders for this visit:    Contusion of right great toe with damage to nail, initial encounter  -     X-Ray Foot Complete Right; Future    Other orders  -     doxycycline (VIBRA-TABS) 100 MG tablet; Take 1 tablet (100 mg total) by mouth 2 (two) times daily. for 7 days      I counseled the patient on his conditions, their implications and medical management.    Continue local wound care. Xrays without fracture dislocation.    F/u 2 weeks.     ED if F/N/V/C or acute worsening of the toe    Geovanny Rose DPM

## 2022-08-24 ENCOUNTER — DOCUMENTATION ONLY (OUTPATIENT)
Dept: ADMINISTRATIVE | Facility: OTHER | Age: 72
End: 2022-08-24
Payer: MEDICARE

## 2022-08-26 NOTE — PROGRESS NOTES
2022      Eber Bocanegra M.D.   [address] [place] [address].      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 Group clinical stage KOLE cT4d (gross extension into the right levator ani muscle complex with circumferential resection margin involvement) cN2 (MR scan shows at least 3 perirectal lymph node metastases, as well as a definite 1.6 cm right external iliac lymph node metastasis and 5 mm right internal iliac lymph node ) cM1a (PET-CT scan is highly suggestive of multiple lung metastases).     Dear Jadiel:     I am seeing your patient in consultation today at your request.  This note will supplement information since I last saw the patient on May 12, 2021.  The patient is one year and four months after completing palliative pelvic irradiation.  Since this is relevant to the current situation, I have reviewed the previous radiation therapy treatment plan, that we utilized a three-field belly board technique using 15 MV photons.  25 Gy was delivered at 5 Gy per fraction with radiation alone.     The patient had excellent response to the palliative radiation therapy and the life-threatening hematochezia resolved.     In May of 2021, the patient started FOLFOX, and Avastin was added in 2021.  The patient was later switched to Avastin and Xeloda; however, eventually, he had tumor progression.     From May 23 until  of this year, the patient has been receiving Avastin and irinotecan, and he received four doses.  This regimen was stopped, however, because of recent progression.     On  of this year, the patient saw Dr. Bocanegra.  The patient was complaining of rectal pain.  Current CBC and comprehensive metabolic profile are fairly unremarkable.  CEA continues to  increase and is now 37.4.     On August 18, PET-CT scan with diagnostic CT scans were performed for both diagnostic, as well as radiation therapy treatment planning purposes.  I have reviewed the images, as well as read the radiologist's interpretation.  There continues to be innumerable bilateral hypermetabolic lung nodules and the largest lung nodule metastasis is about 17 mm.  There appears to be some new lung metastases.  There is a 1.2 cm hypermetabolic right iliac lymph node that is unchanged in size.  There is a hypermetabolic soft tissue thickening in the rectum and anal canal, and this hypermetabolic region extends from the anal verge to about 6.5 cm proximal to the anal verge.      The patient saw you on August 22 and the  patient has been referred for Radiation Oncology  consultation.     The patient's greatest complaint is severe rectal pain.  He describes the pain as a 10 out of a total of 10 scale pain.  Currently, he is using the Duragesic 25 mcg per hour patch, as well as Norco 10, 12 pills a day.  This pain regimen is not controlling his pain.  The second greatest complaint is difficulty emptying his bowels.  He also has difficulty emptying his bladder.  He notices some hematochezia, but is not near as severe, as prior to his radiation treatment in 2021.  The patient has mild shortness of breath.  He denies any cough.  He still is able to ambulate well on his own with only a cane or occasionally with a walker.  His weight has gone from 165 pounds six months ago to 145 pounds currently.     Past Medical History has been completely updated in the medical records.     Physical examination shows a man in apparent pain.  He is only mildly debilitated (performance score equals 1-2).  There is no supraclavicular lymphadenopathy.  The patient has a firm 1 cm right and left inguinal lymph node that may contain metastasis.  Abdomen shows no abnormalities.  Digital rectal examination is severely abnormal.   There is a painful mass that extends to the right anal verge.  The mass is obstructing.  Examination is extremely painful and a full digital rectal examination cannot be performed, and the examining finger cannot be extended very far into the anal canal.     Assessment and Plan:  The patient is one year and four months after palliative pelvic irradiation.  The patient responded well, but now is increasingly symptomatic with severe pelvic pain and rectal obstruction.  I think that palliative re-treatment irradiation is appropriate and the goals would be to help control pain, help relieve rectal obstruction and help control hematochezia.   I spoke with you on the phone and after this discussion,  will be using Xeloda with palliative re-treatment irradiation.  The dose and fractionation will be determined after radiation therapy treatment planning has been performed.  I will add the treatment plan that was delivered one year and four months ago to the current plan.  Since the pain is so uncontrolled, I went ahead and increased his fentanyl patch to 50 mcg per hour, and he will continue to use the Norco 10 as needed.       Greater than 95 minutes was spent on this lengthy re-consultation, 25 minutes was spent in record and image review, 60 minutes was spent directly with the patient and his spouse, and 10 minutes was spent on documentation.     The patient was informed of the acute and late side effects of treatment.  He also was informed that re-treatment irradiation does carry additional risks.  I have reviewed a site-specific, as well as re-treatment consent form.  The patient has good understanding.  The patient has signed the site-specific consent form.     I would like to thank all of Mr. Santana's physicians for the opportunity to consult on their patients.  Any of these physicians should feel free to call me with any questions or comments.     Sincerely,          Electronically Signed By:  Kunal Stephens  TANNA IVORY/MedQ  DD:  08/25/2022  DT:  08/25/2022  Job #:  [id number]    CC:  Greg William M.D., [address] , [address], [place] [address], Fax: [phone]        Alex Oliver M.D., [address], [place] [address], Fax: [phone]        Juan Garner M.D., [address], [place] [address], Fax: [phone]        Rakesh Wheeler M.D., [address], [place] [address], Fax: [phone]        Asael Morris Jr., MD, [address], [address], [place] [address], Fax: [phone]        Dominic De Leon M.D., [address] [facility] [address], [place] [address], Fax: [phone]

## 2022-09-07 ENCOUNTER — TUMOR BOARD CONFERENCE (OUTPATIENT)
Dept: HEMATOLOGY/ONCOLOGY | Facility: CLINIC | Age: 72
End: 2022-09-07
Payer: MEDICARE

## 2022-09-07 NOTE — PROGRESS NOTES
[facility]    Upper GI Tumor Board Note      Date: 09/07/2022  MRN: [id number]  Site: Anal  Presenter: Hensusumaribel    Summary: 70 y/o male known to Dr. Bocanegra with Dx in March 2021 withStage KOLE cT4d cN2 cM1a adenocarcinoma of the anal canal involving lymph & lung.  Received palliative pelvic irradiation , FOLFOX/Avastin; Avastin/Xeloda; Avastin/Irinotecan  Complaints:  rectal pain; referred for evaluation of symptom relief/obstruction  Consult done 08/24 with recommendation of XRT/Xeloda    Recommendations: Palliate with XRT    Consult: Rad/Onc    Stage: KOLE    Tumor: T4d Node(s): cN2 Metastasis: cM1a     Group Stage:       Unstageable: No    Metastatic site(s): lymph, lung    [facility] Treatment Guidelines reviewed and care plan is consistent with guidelines, i.e., NCCN, NCL, PDQ, ASCO, AUA, etc.    Presentation at Cancer Conference: Prospective    Discussed possible entry into Clinical Trial: No  Edwin Brunson NP

## 2022-09-22 ENCOUNTER — DOCUMENTATION ONLY (OUTPATIENT)
Dept: ADMINISTRATIVE | Facility: OTHER | Age: 72
End: 2022-09-22
Payer: MEDICARE

## 2022-09-26 NOTE — PROGRESS NOTES
2022      Eber Bocanegra M.D.   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 Group clinical stage KOLE, cT4d (gross extension into the right levator ani muscle complex with circumferential resection margin involvement) cN2 (MR scan shows at least three perirectal lymph node metastases, as well as a definite 1.6 cm right external iliac lymph node metastasis and 5 mm right internal iliac lymph node ) cM1a (PET-CT scan is highly suggestive of multiple lung metastases).     Aureliano Duvall:     Later today, your patient will finish palliative re-treatment irradiation to the pelvis.  It was delivered with concurrent Xeloda.  This note will summarize his treatment.     Irradiation was started on  and will finish later today, .  Radiation treatment technique employed intensity-modulated 6 MV photons using a 2-arc VMAT technique.  Daily image guidance was accomplished using daily cone beam CTs.  Using this technique, 39 Gy re-treatment dose was delivered at 1.5 Gy per fraction, delivered twice-a-day fashion.  The patient had no problems with the treatment.      Unfortunately, he continued to have severe pain which was unchanged.  His pain is definitely not related to bowel obstruction and his bowels still are working well. The patient understands in the future a diverting colostomy may very well be needed.   I increased the patient's fentanyl patch up to 100 mcg per hour patch, and he also is taking p.r.n. short-acting pain medicines.  I have asked to see the patient in one month, and the patient will be seeing you in the meantime.     I appreciate the opportunity to consult on your patients.  Please call me with any questions or  comments.     Sincerely,          Electronically Signed By:  TANNA Serrano/Db  DD:  09/22/2022  DT:  09/26/2022  Job #:  [id number]    CC:  Rakesh Wheeler M.D., [address], [address], [place] [address], Fax: [phone]        Asael Morris Jr., MD, [address], [address], [place] [address], Fax: [phone]        Alex Oliver M.D., [address], [place] [address], Fax: [phone]        Greg William M.D., [address] , [address], [place] [address], Fax: [phone]        Juan Garner M.D., [address], [place] [address], Fax: [phone]        Dominic De Leon M.D., [address], [place] [address], Fax: [phone]

## 2022-09-28 PROBLEM — Z71.89 ACP (ADVANCE CARE PLANNING): Status: ACTIVE | Noted: 2022-09-28

## 2022-09-28 PROBLEM — R52 INTRACTABLE PAIN: Status: ACTIVE | Noted: 2022-09-28

## 2022-09-29 PROBLEM — G58.8 PUDENDAL NEURALGIA: Status: ACTIVE | Noted: 2022-09-29

## 2022-10-03 PROBLEM — R31.9 HEMATURIA: Status: ACTIVE | Noted: 2022-10-03

## 2022-10-04 PROBLEM — Z95.828 PORT-A-CATH IN PLACE: Status: ACTIVE | Noted: 2022-10-04
